# Patient Record
Sex: FEMALE | Race: WHITE | NOT HISPANIC OR LATINO | Employment: FULL TIME | URBAN - METROPOLITAN AREA
[De-identification: names, ages, dates, MRNs, and addresses within clinical notes are randomized per-mention and may not be internally consistent; named-entity substitution may affect disease eponyms.]

---

## 2018-03-20 ENCOUNTER — OFFICE VISIT CONVERTED (OUTPATIENT)
Dept: FAMILY MEDICINE CLINIC | Facility: CLINIC | Age: 23
End: 2018-03-20
Attending: NURSE PRACTITIONER

## 2018-04-16 ENCOUNTER — OFFICE VISIT CONVERTED (OUTPATIENT)
Dept: FAMILY MEDICINE CLINIC | Facility: CLINIC | Age: 23
End: 2018-04-16
Attending: NURSE PRACTITIONER

## 2018-04-19 ENCOUNTER — OFFICE VISIT CONVERTED (OUTPATIENT)
Dept: FAMILY MEDICINE CLINIC | Facility: CLINIC | Age: 23
End: 2018-04-19
Attending: FAMILY MEDICINE

## 2018-04-20 ENCOUNTER — CONVERSION ENCOUNTER (OUTPATIENT)
Dept: FAMILY MEDICINE CLINIC | Facility: CLINIC | Age: 23
End: 2018-04-20

## 2018-04-20 ENCOUNTER — OFFICE VISIT CONVERTED (OUTPATIENT)
Dept: FAMILY MEDICINE CLINIC | Facility: CLINIC | Age: 23
End: 2018-04-20
Attending: NURSE PRACTITIONER

## 2018-10-29 ENCOUNTER — OFFICE VISIT CONVERTED (OUTPATIENT)
Dept: FAMILY MEDICINE CLINIC | Facility: CLINIC | Age: 23
End: 2018-10-29
Attending: NURSE PRACTITIONER

## 2018-11-26 ENCOUNTER — OFFICE VISIT CONVERTED (OUTPATIENT)
Dept: FAMILY MEDICINE CLINIC | Facility: CLINIC | Age: 23
End: 2018-11-26
Attending: NURSE PRACTITIONER

## 2018-12-19 ENCOUNTER — OFFICE VISIT CONVERTED (OUTPATIENT)
Dept: FAMILY MEDICINE CLINIC | Facility: CLINIC | Age: 23
End: 2018-12-19
Attending: NURSE PRACTITIONER

## 2019-04-11 ENCOUNTER — HOSPITAL ENCOUNTER (OUTPATIENT)
Dept: FAMILY MEDICINE CLINIC | Facility: CLINIC | Age: 24
Discharge: HOME | End: 2019-04-11
Attending: NURSE PRACTITIONER

## 2019-04-11 ENCOUNTER — OFFICE VISIT CONVERTED (OUTPATIENT)
Dept: FAMILY MEDICINE CLINIC | Facility: CLINIC | Age: 24
End: 2019-04-11
Attending: NURSE PRACTITIONER

## 2019-04-11 LAB
ALBUMIN SERPL-MCNC: 4.3 G/DL (ref 3.5–5)
ALBUMIN/GLOB SERPL: 1.3 {RATIO} (ref 1.4–2.6)
ALP SERPL-CCNC: 49 U/L (ref 42–98)
ALT SERPL-CCNC: 15 U/L (ref 10–40)
ANION GAP SERPL CALC-SCNC: 20 MMOL/L (ref 8–19)
APPEARANCE UR: CLEAR
AST SERPL-CCNC: 18 U/L (ref 15–50)
BASOPHILS # BLD AUTO: 0.08 10*3/UL (ref 0–0.2)
BASOPHILS NFR BLD AUTO: 0.8 % (ref 0–3)
BILIRUB SERPL-MCNC: 0.28 MG/DL (ref 0.2–1.3)
BILIRUB UR QL: NEGATIVE
BUN SERPL-MCNC: 9 MG/DL (ref 5–25)
BUN/CREAT SERPL: 12 {RATIO} (ref 6–20)
CALCIUM SERPL-MCNC: 9.4 MG/DL (ref 8.7–10.4)
CHLORIDE SERPL-SCNC: 103 MMOL/L (ref 99–111)
CHOLEST SERPL-MCNC: 142 MG/DL (ref 107–200)
CHOLEST/HDLC SERPL: 3.3 {RATIO} (ref 3–6)
COLOR UR: YELLOW
CONV ABS IMM GRAN: 0.09 10*3/UL (ref 0–0.2)
CONV BACTERIA: NEGATIVE
CONV CO2: 21 MMOL/L (ref 22–32)
CONV COLLECTION SOURCE (UA): ABNORMAL
CONV CREATININE URINE, RANDOM: 34.5 MG/DL (ref 10–300)
CONV IMMATURE GRAN: 1 % (ref 0–1.8)
CONV MICROALBUM.,U,RANDOM: <12 MG/L (ref 0–20)
CONV TOTAL PROTEIN: 7.6 G/DL (ref 6.3–8.2)
CONV UROBILINOGEN IN URINE BY AUTOMATED TEST STRIP: 0.2 {EHRLICHU}/DL (ref 0.1–1)
CREAT UR-MCNC: 0.73 MG/DL (ref 0.5–0.9)
DEPRECATED RDW RBC AUTO: 44.1 FL (ref 36.4–46.3)
EOSINOPHIL # BLD AUTO: 0.24 10*3/UL (ref 0–0.7)
EOSINOPHIL # BLD AUTO: 2.5 % (ref 0–7)
ERYTHROCYTE [DISTWIDTH] IN BLOOD BY AUTOMATED COUNT: 13.1 % (ref 11.7–14.4)
GFR SERPLBLD BASED ON 1.73 SQ M-ARVRAT: >60 ML/MIN/{1.73_M2}
GLOBULIN UR ELPH-MCNC: 3.3 G/DL (ref 2–3.5)
GLUCOSE SERPL-MCNC: 83 MG/DL (ref 65–99)
GLUCOSE UR QL: NEGATIVE MG/DL
HBA1C MFR BLD: 12.5 G/DL (ref 12–16)
HCT VFR BLD AUTO: 39.9 % (ref 37–47)
HDLC SERPL-MCNC: 43 MG/DL (ref 40–60)
HGB UR QL STRIP: NEGATIVE
KETONES UR QL STRIP: NEGATIVE MG/DL
LDLC SERPL CALC-MCNC: 66 MG/DL (ref 70–100)
LEUKOCYTE ESTERASE UR QL STRIP: ABNORMAL
LYMPHOCYTES # BLD AUTO: 2.72 10*3/UL (ref 1–5)
MCH RBC QN AUTO: 28.8 PG (ref 27–31)
MCHC RBC AUTO-ENTMCNC: 31.3 G/DL (ref 33–37)
MCV RBC AUTO: 91.9 FL (ref 81–99)
MICROALBUMIN/CREAT UR: 34.8 MG/G{CRE} (ref 0–35)
MONOCYTES # BLD AUTO: 0.46 10*3/UL (ref 0.2–1.2)
MONOCYTES NFR BLD AUTO: 4.9 % (ref 3–10)
NEUTROPHILS # BLD AUTO: 5.84 10*3/UL (ref 2–8)
NEUTROPHILS NFR BLD AUTO: 62 % (ref 30–85)
NITRITE UR QL STRIP: NEGATIVE
NRBC CBCN: 0 % (ref 0–0.7)
OSMOLALITY SERPL CALC.SUM OF ELEC: 288 MOSM/KG (ref 273–304)
PH UR STRIP.AUTO: 6.5 [PH] (ref 5–8)
PLATELET # BLD AUTO: 388 10*3/UL (ref 130–400)
PMV BLD AUTO: 11.7 FL (ref 9.4–12.3)
POTASSIUM SERPL-SCNC: 4.3 MMOL/L (ref 3.5–5.3)
PROT UR QL: NEGATIVE MG/DL
RBC # BLD AUTO: 4.34 10*6/UL (ref 4.2–5.4)
RBC #/AREA URNS HPF: ABNORMAL /[HPF]
SODIUM SERPL-SCNC: 140 MMOL/L (ref 135–147)
SP GR UR: 1.01 (ref 1–1.03)
SQUAMOUS SPT QL MICRO: ABNORMAL /[HPF]
TRIGL SERPL-MCNC: 166 MG/DL (ref 40–150)
VARIANT LYMPHS NFR BLD MANUAL: 28.8 % (ref 20–45)
VLDLC SERPL-MCNC: 33 MG/DL (ref 5–37)
WBC # BLD AUTO: 9.43 10*3/UL (ref 4.8–10.8)
WBC #/AREA URNS HPF: ABNORMAL /[HPF]

## 2021-02-04 ENCOUNTER — TELEPHONE (OUTPATIENT)
Dept: FAMILY MEDICINE CLINIC | Facility: CLINIC | Age: 26
End: 2021-02-04

## 2021-02-04 NOTE — TELEPHONE ENCOUNTER
Called new patient and had to leave a VM we have her scheduled on 02/15/2021 with Dr.Kelly Elizabeth, he will be out of the office that day so I told her to contact our office and we will reschedule her appt.

## 2021-02-08 ENCOUNTER — OFFICE VISIT (OUTPATIENT)
Dept: FAMILY MEDICINE CLINIC | Facility: CLINIC | Age: 26
End: 2021-02-08

## 2021-02-08 VITALS
TEMPERATURE: 98.2 F | SYSTOLIC BLOOD PRESSURE: 180 MMHG | HEART RATE: 119 BPM | OXYGEN SATURATION: 98 % | HEIGHT: 68 IN | DIASTOLIC BLOOD PRESSURE: 110 MMHG | BODY MASS INDEX: 40.32 KG/M2 | WEIGHT: 266 LBS

## 2021-02-08 DIAGNOSIS — E28.2 PCOS (POLYCYSTIC OVARIAN SYNDROME): ICD-10-CM

## 2021-02-08 DIAGNOSIS — I10 ESSENTIAL HYPERTENSION: ICD-10-CM

## 2021-02-08 DIAGNOSIS — G44.229 CHRONIC TENSION-TYPE HEADACHE, NOT INTRACTABLE: ICD-10-CM

## 2021-02-08 DIAGNOSIS — E66.01 CLASS 3 SEVERE OBESITY DUE TO EXCESS CALORIES WITHOUT SERIOUS COMORBIDITY WITH BODY MASS INDEX (BMI) OF 40.0 TO 44.9 IN ADULT (HCC): ICD-10-CM

## 2021-02-08 DIAGNOSIS — Z00.00 PREVENTATIVE HEALTH CARE: Primary | ICD-10-CM

## 2021-02-08 PROBLEM — E66.813 CLASS 3 SEVERE OBESITY DUE TO EXCESS CALORIES WITHOUT SERIOUS COMORBIDITY WITH BODY MASS INDEX (BMI) OF 40.0 TO 44.9 IN ADULT: Status: ACTIVE | Noted: 2021-02-08

## 2021-02-08 PROCEDURE — 99204 OFFICE O/P NEW MOD 45 MIN: CPT | Performed by: NURSE PRACTITIONER

## 2021-02-08 RX ORDER — LISINOPRIL AND HYDROCHLOROTHIAZIDE 20; 12.5 MG/1; MG/1
1 TABLET ORAL DAILY
Qty: 30 TABLET | Refills: 2 | Status: SHIPPED | OUTPATIENT
Start: 2021-02-08 | End: 2021-05-11

## 2021-02-08 NOTE — PATIENT INSTRUCTIONS
Fasting blood work  Keep appointment with OB/GYN  Take blood pressure medicine as directed monitor blood pressures according to parameters given and call office  Diet and exercise consider bariatric referral  Follow-up 6 months

## 2021-02-08 NOTE — PROGRESS NOTES
Meeta Garcia is a 25 y.o. female.     25-year-old obese white female with history of PCOS, headaches and hypertension who comes in today to be established as new patient  Patient currently not taking any medications for about a year.  Her blood pressure today is 180/110 heart rate 120 and she denies any chest pain, dyspnea, tachycardia or dizziness.  Unfortunately patient is unable to tell when her pressure is high.  I am placing her on lisinopril hydrochlorothiazide in the morning and she is to monitor blood pressures with the parameters given and call office if they are not met.  I also told her to monitor her heart rate to see if it is always over 100  Patient does not have an OB/GYN here since she moved here a year ago [facility] to be setting her up with one  Patient has a history of diabetes on her dad's side will be doing fasting blood work to make sure there are no abnormal findings  Patient had Covid in January 2021 still is unable to taste or smell anything.  I encouraged her to still get the vaccine 90 days after having Covid   patient has a history of headaches  mainly around the temple area off and on.  She has not had eye exam for a couple years I advised her to get one but to let me know if headaches worsen.  She has had no work-up for headaches.  Or been to neurology  Weight is 266 with a BMI of 40.5.  Patient states no matter what she does she just cannot lose weight we discussed options such as bariatric surgery.  She is going to follow-up with her OB/GYN    Fasting blood work  OB/GYN referral  Lisinopril/hydrochlorothiazide 20/12.5 mg daily  Monitor blood pressure according to parameters given   Diet and exercise consider bariatric surgery referral  call the office follow-up 6 months           The following portions of the patient's history were reviewed and updated as appropriate: allergies, current medications, past family history, past medical history, past social history, past surgical history  "and problem list.    Vitals:    02/08/21 1456   BP: (!) 180/110   BP Location: Left arm   Patient Position: Sitting   Cuff Size: Large Adult   Pulse: 119   Temp: 98.2 °F (36.8 °C)   TempSrc: Temporal   SpO2: 98%   Weight: 121 kg (266 lb)   Height: 172.7 cm (68\")     Body mass index is 40.45 kg/m².    Past Medical History:   Diagnosis Date   • Hypertension    • PCOS (polycystic ovarian syndrome)      History reviewed. No pertinent surgical history.  Family History   Problem Relation Age of Onset   • Hypertension Mother    • Diabetes Father      Immunization History   Administered Date(s) Administered   • DTP / HiB 02/13/1996, 04/16/1996   • DTaP, Unspecified 09/09/1999   • Flu Mist 08/27/2013   • Flulaval/Fluarix/Fluzone Quad 09/01/2020   • HPV Quadrivalent 09/08/2010, 03/10/2011   • Influenza LAIV (Nasal) 01/24/2013   • MMR 09/09/1999   • OPV 02/13/1996, 04/16/1996, 09/09/1999       No results found for any previous visit.         Review of Systems   Constitutional: Negative.    HENT: Negative.    Respiratory: Negative.    Cardiovascular: Negative.    Gastrointestinal: Negative.    Genitourinary: Negative.    Musculoskeletal: Negative.    Skin: Negative.    Neurological: Positive for headache.   Psychiatric/Behavioral: Negative.        Objective   Physical Exam  Constitutional:       Appearance: Normal appearance.   HENT:      Head: Normocephalic.   Neck:      Musculoskeletal: Normal range of motion.   Cardiovascular:      Rate and Rhythm: Normal rate and regular rhythm.      Pulses: Normal pulses.      Heart sounds: Normal heart sounds.   Pulmonary:      Effort: Pulmonary effort is normal.      Breath sounds: Normal breath sounds.   Abdominal:      General: Bowel sounds are normal.   Musculoskeletal: Normal range of motion.   Skin:     General: Skin is warm and dry.   Neurological:      General: No focal deficit present.      Mental Status: She is alert and oriented to person, place, and time.   Psychiatric:        "  Mood and Affect: Mood normal.         Procedures    Assessment/Plan   Diagnoses and all orders for this visit:    1. Preventative health care (Primary)  -     CBC & Differential; Future  -     Comprehensive Metabolic Panel; Future  -     Lipid Panel With LDL / HDL Ratio; Future    2. Essential hypertension    3. PCOS (polycystic ovarian syndrome)    4. Class 3 severe obesity due to excess calories without serious comorbidity with body mass index (BMI) of 40.0 to 44.9 in adult (CMS/[facility])    5. Chronic tension-type headache, not intractable    Other orders  -     lisinopril-hydrochlorothiazide (PRINZIDE,ZESTORETIC) 20-12.5 MG per tablet; Take 1 tablet by mouth Daily.  Dispense: 30 tablet; Refill: 2          Current Outpatient Medications:   •  lisinopril-hydrochlorothiazide (PRINZIDE,ZESTORETIC) 20-12.5 MG per tablet, Take 1 tablet by mouth Daily., Disp: 30 tablet, Rfl: 2

## 2021-02-09 DIAGNOSIS — Z00.00 PREVENTATIVE HEALTH CARE: ICD-10-CM

## 2021-02-10 LAB
ALBUMIN SERPL-MCNC: 4.4 G/DL (ref 3.9–5)
ALBUMIN/GLOB SERPL: 1.3 {RATIO} (ref 1.2–2.2)
ALP SERPL-CCNC: 83 IU/L (ref 39–117)
ALT SERPL-CCNC: 17 IU/L (ref 0–32)
AST SERPL-CCNC: 16 IU/L (ref 0–40)
BASOPHILS # BLD AUTO: 0.1 X10E3/UL (ref 0–0.2)
BASOPHILS NFR BLD AUTO: 1 %
BILIRUB SERPL-MCNC: 0.5 MG/DL (ref 0–1.2)
BUN SERPL-MCNC: 8 MG/DL (ref 6–20)
BUN/CREAT SERPL: 11 (ref 9–23)
CALCIUM SERPL-MCNC: 9.8 MG/DL (ref 8.7–10.2)
CHLORIDE SERPL-SCNC: 100 MMOL/L (ref 96–106)
CHOLEST SERPL-MCNC: 184 MG/DL (ref 100–199)
CO2 SERPL-SCNC: 23 MMOL/L (ref 20–29)
CREAT SERPL-MCNC: 0.72 MG/DL (ref 0.57–1)
EOSINOPHIL # BLD AUTO: 0.2 X10E3/UL (ref 0–0.4)
EOSINOPHIL NFR BLD AUTO: 1 %
ERYTHROCYTE [DISTWIDTH] IN BLOOD BY AUTOMATED COUNT: 13.6 % (ref 11.7–15.4)
GLOBULIN SER CALC-MCNC: 3.3 G/DL (ref 1.5–4.5)
GLUCOSE SERPL-MCNC: 78 MG/DL (ref 65–99)
HCT VFR BLD AUTO: 39.8 % (ref 34–46.6)
HDLC SERPL-MCNC: 43 MG/DL
HGB BLD-MCNC: 12.9 G/DL (ref 11.1–15.9)
IMM GRANULOCYTES # BLD AUTO: 0.1 X10E3/UL (ref 0–0.1)
IMM GRANULOCYTES NFR BLD AUTO: 1 %
LDLC SERPL CALC-MCNC: 103 MG/DL (ref 0–99)
LDLC/HDLC SERPL: 2.4 RATIO (ref 0–3.2)
LYMPHOCYTES # BLD AUTO: 3.8 X10E3/UL (ref 0.7–3.1)
LYMPHOCYTES NFR BLD AUTO: 29 %
MCH RBC QN AUTO: 27.2 PG (ref 26.6–33)
MCHC RBC AUTO-ENTMCNC: 32.4 G/DL (ref 31.5–35.7)
MCV RBC AUTO: 84 FL (ref 79–97)
MONOCYTES # BLD AUTO: 0.6 X10E3/UL (ref 0.1–0.9)
MONOCYTES NFR BLD AUTO: 4 %
NEUTROPHILS # BLD AUTO: 8.3 X10E3/UL (ref 1.4–7)
NEUTROPHILS NFR BLD AUTO: 64 %
PLATELET # BLD AUTO: 431 X10E3/UL (ref 150–450)
POTASSIUM SERPL-SCNC: 4.3 MMOL/L (ref 3.5–5.2)
PROT SERPL-MCNC: 7.7 G/DL (ref 6–8.5)
RBC # BLD AUTO: 4.75 X10E6/UL (ref 3.77–5.28)
SODIUM SERPL-SCNC: 137 MMOL/L (ref 134–144)
TRIGL SERPL-MCNC: 219 MG/DL (ref 0–149)
VLDLC SERPL CALC-MCNC: 38 MG/DL (ref 5–40)
WBC # BLD AUTO: 13 X10E3/UL (ref 3.4–10.8)

## 2021-05-09 VITALS
WEIGHT: 247 LBS | DIASTOLIC BLOOD PRESSURE: 80 MMHG | TEMPERATURE: 97.4 F | OXYGEN SATURATION: 98 % | SYSTOLIC BLOOD PRESSURE: 120 MMHG | HEART RATE: 140 BPM

## 2021-05-09 VITALS
SYSTOLIC BLOOD PRESSURE: 142 MMHG | OXYGEN SATURATION: 98 % | OXYGEN SATURATION: 98 % | WEIGHT: 239.12 LBS | HEART RATE: 130 BPM | HEIGHT: 68 IN | DIASTOLIC BLOOD PRESSURE: 84 MMHG | WEIGHT: 238.12 LBS | HEART RATE: 104 BPM | TEMPERATURE: 97 F | SYSTOLIC BLOOD PRESSURE: 190 MMHG | TEMPERATURE: 97.4 F | BODY MASS INDEX: 36.24 KG/M2 | DIASTOLIC BLOOD PRESSURE: 102 MMHG

## 2021-05-09 VITALS
WEIGHT: 249.12 LBS | HEART RATE: 112 BPM | SYSTOLIC BLOOD PRESSURE: 138 MMHG | OXYGEN SATURATION: 99 % | DIASTOLIC BLOOD PRESSURE: 80 MMHG | TEMPERATURE: 98.6 F | BODY MASS INDEX: 37.76 KG/M2 | HEIGHT: 68 IN

## 2021-05-09 VITALS
SYSTOLIC BLOOD PRESSURE: 180 MMHG | OXYGEN SATURATION: 98 % | WEIGHT: 236 LBS | HEIGHT: 68 IN | HEART RATE: 117 BPM | BODY MASS INDEX: 35.77 KG/M2 | TEMPERATURE: 97 F | DIASTOLIC BLOOD PRESSURE: 110 MMHG

## 2021-05-09 VITALS
DIASTOLIC BLOOD PRESSURE: 80 MMHG | OXYGEN SATURATION: 99 % | WEIGHT: 253 LBS | HEART RATE: 125 BPM | SYSTOLIC BLOOD PRESSURE: 146 MMHG | TEMPERATURE: 98 F

## 2021-05-09 VITALS
OXYGEN SATURATION: 99 % | WEIGHT: 255 LBS | TEMPERATURE: 97.7 F | HEART RATE: 122 BPM | DIASTOLIC BLOOD PRESSURE: 86 MMHG | SYSTOLIC BLOOD PRESSURE: 136 MMHG

## 2021-05-09 VITALS
HEIGHT: 69 IN | OXYGEN SATURATION: 96 % | BODY MASS INDEX: 34.8 KG/M2 | SYSTOLIC BLOOD PRESSURE: 160 MMHG | TEMPERATURE: 98.6 F | HEART RATE: 114 BPM | DIASTOLIC BLOOD PRESSURE: 90 MMHG | WEIGHT: 235 LBS

## 2021-05-11 RX ORDER — LISINOPRIL AND HYDROCHLOROTHIAZIDE 20; 12.5 MG/1; MG/1
TABLET ORAL
Qty: 30 TABLET | Refills: 0 | Status: SHIPPED | OUTPATIENT
Start: 2021-05-11 | End: 2021-06-13

## 2021-05-18 ENCOUNTER — OFFICE VISIT (OUTPATIENT)
Dept: FAMILY MEDICINE CLINIC | Facility: CLINIC | Age: 26
End: 2021-05-18

## 2021-05-18 VITALS
BODY MASS INDEX: 39.04 KG/M2 | OXYGEN SATURATION: 96 % | SYSTOLIC BLOOD PRESSURE: 152 MMHG | DIASTOLIC BLOOD PRESSURE: 81 MMHG | HEIGHT: 68 IN | WEIGHT: 257.6 LBS | HEART RATE: 118 BPM | TEMPERATURE: 97.6 F

## 2021-05-18 DIAGNOSIS — E28.2 PCOS (POLYCYSTIC OVARIAN SYNDROME): ICD-10-CM

## 2021-05-18 DIAGNOSIS — N92.6 ABNORMAL MENSTRUAL PERIODS: ICD-10-CM

## 2021-05-18 DIAGNOSIS — E66.01 CLASS 3 SEVERE OBESITY DUE TO EXCESS CALORIES WITHOUT SERIOUS COMORBIDITY WITH BODY MASS INDEX (BMI) OF 40.0 TO 44.9 IN ADULT (HCC): Primary | ICD-10-CM

## 2021-05-18 PROCEDURE — 99213 OFFICE O/P EST LOW 20 MIN: CPT | Performed by: NURSE PRACTITIONER

## 2021-05-18 RX ORDER — ONDANSETRON 4 MG/1
4 TABLET, FILM COATED ORAL EVERY 8 HOURS PRN
Qty: 30 TABLET | Refills: 2 | Status: SHIPPED | OUTPATIENT
Start: 2021-05-18 | End: 2021-06-03

## 2021-05-18 RX ORDER — DROSPIRENONE AND ETHINYL ESTRADIOL 0.03MG-3MG
1 KIT ORAL DAILY
COMMUNITY
Start: 2021-04-12

## 2021-05-18 NOTE — PROGRESS NOTES
"    Meeta Garcia is a 25 y.o. female.     25-year-old obese white female with history of PCOS, headaches and hypertension who comes in today with complaints of nausea.  Patient was recently placed on birth control 4 weeks ago and states ever since then she has been having extremely heavy periods every day for the last 4 weeks and after talking with patient her pain is really in her abdomen not her stomach.  Patient states she has never had menstrual cramps before which I am assuming is what she is having.  She does have some nausea but has not had any emesis I am placing her on Zofran and Prilosec and instructed her to call her OB/GYN today to see if this is something she needs to see her about.  If OB/GYN does not want to see her in this heavy period persist I instructed patient to let me draw a CBC on her make sure she is not getting anemic.  Patient had her first Covid injection 3 weeks ago and has another one scheduled next week blood pressure 152/80 heart rate 116 she denies any chest pain, dyspnea, tachycardia or dizziness  Weight is down 8 pounds at 258 with a BMI 39.2 patient states she has been eating less because has been so busy at work      Call OB/GYN today about heavy periods  If heavy periods persist call office for CBC  Zofran 4 mg every 6 hours x3 days then as needed  Tolley diet  Prilosec 20 mg daily for 2 weeks  If nausea persists call office         The following portions of the patient's history were reviewed and updated as appropriate: allergies, current medications, past family history, past medical history, past social history, past surgical history and problem list.    Vitals:    05/18/21 0803   BP: 152/81   BP Location: Left arm   Patient Position: Sitting   Cuff Size: Large Adult   Pulse: 118   Temp: 97.6 °F (36.4 °C)   TempSrc: Temporal   SpO2: 96%   Weight: 117 kg (257 lb 9.6 oz)   Height: 172.7 cm (68\")     Body mass index is 39.17 kg/m².    Past Medical History:   Diagnosis Date   • " Hypertension    • PCOS (polycystic ovarian syndrome)      No past surgical history on file.  Family History   Problem Relation Age of Onset   • Hypertension Mother    • Diabetes Father      Immunization History   Administered Date(s) Administered   • DTP / HiB 02/13/1996, 04/16/1996   • DTaP, Unspecified 09/09/1999   • Flu Mist 08/27/2013   • Flulaval/Fluarix/Fluzone Quad 09/01/2020   • HPV Quadrivalent 09/08/2010, 03/10/2011   • Influenza LAIV (Nasal) 01/24/2013   • MMR 09/09/1999   • OPV 02/13/1996, 04/16/1996, 09/09/1999       Orders Only on 02/09/2021   Component Date Value Ref Range Status   • Total Cholesterol 02/09/2021 184  100 - 199 mg/dL Final   • Triglycerides 02/09/2021 219* 0 - 149 mg/dL Final   • HDL Cholesterol 02/09/2021 43  >39 mg/dL Final   • VLDL Cholesterol Gabe 02/09/2021 38  5 - 40 mg/dL Final   • LDL Chol Calc ([facility]) 02/09/2021 103* 0 - 99 mg/dL Final   • LDL/HDL RATIO 02/09/2021 2.4  0.0 - 3.2 ratio Final    Comment:                                     LDL/HDL Ratio                                              Men  Women                                1/2 Avg.Risk  1.0    1.5                                    Avg.Risk  3.6    3.2                                 2X Avg.Risk  6.2    5.0                                 3X Avg.Risk  8.0    6.1     • Glucose 02/09/2021 78  65 - 99 mg/dL Final   • BUN 02/09/2021 8  6 - 20 mg/dL Final   • Creatinine 02/09/2021 0.72  0.57 - 1.00 mg/dL Final   • eGFR Non  Am 02/09/2021 117  >59 mL/min/1.73 Final   • eGFR African Am 02/09/2021 135  >59 mL/min/1.73 Final   • BUN/Creatinine Ratio 02/09/2021 11  9 - 23 Final   • Sodium 02/09/2021 137  134 - 144 mmol/L Final   • Potassium 02/09/2021 4.3  3.5 - 5.2 mmol/L Final   • Chloride 02/09/2021 100  96 - 106 mmol/L Final   • Total CO2 02/09/2021 23  20 - 29 mmol/L Final   • Calcium 02/09/2021 9.8  8.7 - 10.2 mg/dL Final   • Total Protein 02/09/2021 7.7  6.0 - 8.5 g/dL Final   • Albumin 02/09/2021 4.4  3.9 - 5.0  g/dL Final   • Globulin 02/09/2021 3.3  1.5 - 4.5 g/dL Final   • A/G Ratio 02/09/2021 1.3  1.2 - 2.2 Final   • Total Bilirubin 02/09/2021 0.5  0.0 - 1.2 mg/dL Final   • Alkaline Phosphatase 02/09/2021 83  39 - 117 IU/L Final   • AST (SGOT) 02/09/2021 16  0 - 40 IU/L Final   • ALT (SGPT) 02/09/2021 17  0 - 32 IU/L Final   • WBC 02/09/2021 13.0* 3.4 - 10.8 x10E3/uL Final   • RBC 02/09/2021 4.75  3.77 - 5.28 x10E6/uL Final   • Hemoglobin 02/09/2021 12.9  11.1 - 15.9 g/dL Final   • Hematocrit 02/09/2021 39.8  34.0 - 46.6 % Final   • MCV 02/09/2021 84  79 - 97 fL Final   • MCH 02/09/2021 27.2  26.6 - 33.0 pg Final   • MCHC 02/09/2021 32.4  31.5 - 35.7 g/dL Final   • RDW 02/09/2021 13.6  11.7 - 15.4 % Final   • Platelets 02/09/2021 431  150 - 450 x10E3/uL Final   • Neutrophil Rel % 02/09/2021 64  Not Estab. % Final   • Lymphocyte Rel % 02/09/2021 29  Not Estab. % Final   • Monocyte Rel % 02/09/2021 4  Not Estab. % Final   • Eosinophil Rel % 02/09/2021 1  Not Estab. % Final   • Basophil Rel % 02/09/2021 1  Not Estab. % Final   • Neutrophils Absolute 02/09/2021 8.3* 1.4 - 7.0 x10E3/uL Final   • Lymphocytes Absolute 02/09/2021 3.8* 0.7 - 3.1 x10E3/uL Final   • Monocytes Absolute 02/09/2021 0.6  0.1 - 0.9 x10E3/uL Final   • Eosinophils Absolute 02/09/2021 0.2  0.0 - 0.4 x10E3/uL Final   • Basophils Absolute 02/09/2021 0.1  0.0 - 0.2 x10E3/uL Final   • Immature Granulocyte Rel % 02/09/2021 1  Not Estab. % Final   • Immature Grans Absolute 02/09/2021 0.1  0.0 - 0.1 x10E3/uL Final         Review of Systems   Constitutional: Negative.    HENT: Negative.    Respiratory: Negative.    Cardiovascular: Negative.    Gastrointestinal: Positive for abdominal pain and nausea.   Genitourinary: Positive for menstrual problem.   Musculoskeletal: Negative.    Neurological: Negative.    Psychiatric/Behavioral: Negative.        Objective   Physical Exam  Constitutional:       Appearance: Normal appearance.   HENT:      Head: Normocephalic.    Cardiovascular:      Rate and Rhythm: Normal rate and regular rhythm.   Pulmonary:      Effort: Pulmonary effort is normal.   Abdominal:      General: Bowel sounds are normal.   Musculoskeletal:         General: Normal range of motion.      Cervical back: Normal range of motion.   Skin:     General: Skin is warm and dry.   Neurological:      General: No focal deficit present.      Mental Status: She is alert and oriented to person, place, and time.   Psychiatric:         Mood and Affect: Mood normal.         Behavior: Behavior normal.         Procedures    Assessment/Plan   There are no diagnoses linked to this encounter.      Current Outpatient Medications:   •  drospirenone-ethinyl estradiol (CA,OCELLA) 3-0.03 MG per tablet, Take 1 tablet by mouth Daily., Disp: , Rfl:   •  lisinopril-hydrochlorothiazide (PRINZIDE,ZESTORETIC) 20-12.5 MG per tablet, Take 1 tablet by mouth once daily, Disp: 30 tablet, Rfl: 0  •  ondansetron (Zofran) 4 MG tablet, Take 1 tablet by mouth Every 8 (Eight) Hours As Needed for Nausea or Vomiting., Disp: 30 tablet, Rfl: 2

## 2021-06-03 ENCOUNTER — HOSPITAL ENCOUNTER (EMERGENCY)
Facility: HOSPITAL | Age: 26
Discharge: HOME | End: 2021-06-03
Attending: EMERGENCY MEDICINE | Admitting: EMERGENCY MEDICINE

## 2021-06-03 VITALS
RESPIRATION RATE: 16 BRPM | BODY MASS INDEX: 37.04 KG/M2 | TEMPERATURE: 98.8 F | WEIGHT: 244.4 LBS | SYSTOLIC BLOOD PRESSURE: 121 MMHG | HEART RATE: 100 BPM | HEIGHT: 68 IN | DIASTOLIC BLOOD PRESSURE: 70 MMHG | OXYGEN SATURATION: 100 %

## 2021-06-03 DIAGNOSIS — R11.10 VOMITING, INTRACTABILITY OF VOMITING NOT SPECIFIED, PRESENCE OF NAUSEA NOT SPECIFIED, UNSPECIFIED VOMITING TYPE: Primary | ICD-10-CM

## 2021-06-03 DIAGNOSIS — N39.0 URINARY TRACT INFECTION WITHOUT HEMATURIA, SITE UNSPECIFIED: ICD-10-CM

## 2021-06-03 LAB
ALBUMIN SERPL-MCNC: 3.9 G/DL (ref 3.5–5.2)
ALBUMIN/GLOB SERPL: 1.1 G/DL
ALP SERPL-CCNC: 61 U/L (ref 39–117)
ALT SERPL W P-5'-P-CCNC: 9 U/L (ref 1–33)
ANION GAP SERPL CALCULATED.3IONS-SCNC: 14 MMOL/L (ref 5–15)
AST SERPL-CCNC: 11 U/L (ref 1–32)
B-HCG UR QL: NEGATIVE
BACTERIA UR QL AUTO: ABNORMAL /HPF
BASOPHILS # BLD AUTO: 0.1 10*3/MM3 (ref 0–0.2)
BASOPHILS NFR BLD AUTO: 0.4 % (ref 0–1.5)
BILIRUB SERPL-MCNC: 0.5 MG/DL (ref 0–1.2)
BILIRUB UR QL STRIP: NEGATIVE
BUN SERPL-MCNC: 5 MG/DL (ref 6–20)
BUN/CREAT SERPL: 6.8 (ref 7–25)
CALCIUM SPEC-SCNC: 9.3 MG/DL (ref 8.6–10.5)
CHLORIDE SERPL-SCNC: 97 MMOL/L (ref 98–107)
CLARITY UR: CLEAR
CO2 SERPL-SCNC: 22 MMOL/L (ref 22–29)
COLOR UR: YELLOW
CREAT SERPL-MCNC: 0.73 MG/DL (ref 0.57–1)
DEPRECATED RDW RBC AUTO: 42.4 FL (ref 37–54)
EOSINOPHIL # BLD AUTO: 0.1 10*3/MM3 (ref 0–0.4)
EOSINOPHIL NFR BLD AUTO: 0.7 % (ref 0.3–6.2)
ERYTHROCYTE [DISTWIDTH] IN BLOOD BY AUTOMATED COUNT: 14.5 % (ref 12.3–15.4)
GFR SERPL CREATININE-BSD FRML MDRD: 97 ML/MIN/1.73
GLOBULIN UR ELPH-MCNC: 3.4 GM/DL
GLUCOSE SERPL-MCNC: 108 MG/DL (ref 65–99)
GLUCOSE UR STRIP-MCNC: NEGATIVE MG/DL
HCT VFR BLD AUTO: 39.5 % (ref 34–46.6)
HGB BLD-MCNC: 13.2 G/DL (ref 12–15.9)
HGB UR QL STRIP.AUTO: NEGATIVE
HYALINE CASTS UR QL AUTO: ABNORMAL /LPF
KETONES UR QL STRIP: NEGATIVE
LEUKOCYTE ESTERASE UR QL STRIP.AUTO: ABNORMAL
LIPASE SERPL-CCNC: 16 U/L (ref 13–60)
LYMPHOCYTES # BLD AUTO: 2.2 10*3/MM3 (ref 0.7–3.1)
LYMPHOCYTES NFR BLD AUTO: 14.1 % (ref 19.6–45.3)
MCH RBC QN AUTO: 28.1 PG (ref 26.6–33)
MCHC RBC AUTO-ENTMCNC: 33.4 G/DL (ref 31.5–35.7)
MCV RBC AUTO: 84.2 FL (ref 79–97)
MONOCYTES # BLD AUTO: 0.7 10*3/MM3 (ref 0.1–0.9)
MONOCYTES NFR BLD AUTO: 4.4 % (ref 5–12)
NEUTROPHILS NFR BLD AUTO: 12.3 10*3/MM3 (ref 1.7–7)
NEUTROPHILS NFR BLD AUTO: 80.4 % (ref 42.7–76)
NITRITE UR QL STRIP: NEGATIVE
NRBC BLD AUTO-RTO: 0 /100 WBC (ref 0–0.2)
PH UR STRIP.AUTO: 6.5 [PH] (ref 5–8)
PLATELET # BLD AUTO: 441 10*3/MM3 (ref 140–450)
PMV BLD AUTO: 9 FL (ref 6–12)
POTASSIUM SERPL-SCNC: 4.4 MMOL/L (ref 3.5–5.2)
PROT SERPL-MCNC: 7.3 G/DL (ref 6–8.5)
PROT UR QL STRIP: NEGATIVE
RBC # BLD AUTO: 4.69 10*6/MM3 (ref 3.77–5.28)
RBC # UR: ABNORMAL /HPF
REF LAB TEST METHOD: ABNORMAL
SODIUM SERPL-SCNC: 133 MMOL/L (ref 136–145)
SP GR UR STRIP: 1.01 (ref 1–1.03)
SQUAMOUS #/AREA URNS HPF: ABNORMAL /HPF
UROBILINOGEN UR QL STRIP: ABNORMAL
WBC # BLD AUTO: 15.3 10*3/MM3 (ref 3.4–10.8)
WBC UR QL AUTO: ABNORMAL /HPF

## 2021-06-03 PROCEDURE — 85025 COMPLETE CBC W/AUTO DIFF WBC: CPT | Performed by: EMERGENCY MEDICINE

## 2021-06-03 PROCEDURE — 96374 THER/PROPH/DIAG INJ IV PUSH: CPT

## 2021-06-03 PROCEDURE — 25010000002 ONDANSETRON PER 1 MG: Performed by: EMERGENCY MEDICINE

## 2021-06-03 PROCEDURE — 81001 URINALYSIS AUTO W/SCOPE: CPT | Performed by: EMERGENCY MEDICINE

## 2021-06-03 PROCEDURE — 83690 ASSAY OF LIPASE: CPT | Performed by: EMERGENCY MEDICINE

## 2021-06-03 PROCEDURE — 81025 URINE PREGNANCY TEST: CPT | Performed by: EMERGENCY MEDICINE

## 2021-06-03 PROCEDURE — 99283 EMERGENCY DEPT VISIT LOW MDM: CPT

## 2021-06-03 PROCEDURE — 80053 COMPREHEN METABOLIC PANEL: CPT | Performed by: EMERGENCY MEDICINE

## 2021-06-03 RX ORDER — CEFDINIR 300 MG/1
300 CAPSULE ORAL 2 TIMES DAILY
Qty: 14 CAPSULE | Refills: 0 | Status: SHIPPED | OUTPATIENT
Start: 2021-06-03 | End: 2021-06-10

## 2021-06-03 RX ORDER — SODIUM CHLORIDE 0.9 % (FLUSH) 0.9 %
10 SYRINGE (ML) INJECTION AS NEEDED
Status: DISCONTINUED | OUTPATIENT
Start: 2021-06-03 | End: 2021-06-03 | Stop reason: HOSPADM

## 2021-06-03 RX ORDER — ONDANSETRON 2 MG/ML
4 INJECTION INTRAMUSCULAR; INTRAVENOUS ONCE
Status: COMPLETED | OUTPATIENT
Start: 2021-06-03 | End: 2021-06-03

## 2021-06-03 RX ORDER — ONDANSETRON 4 MG/1
4 TABLET, ORALLY DISINTEGRATING ORAL EVERY 8 HOURS PRN
Qty: 10 TABLET | Refills: 0 | Status: SHIPPED | OUTPATIENT
Start: 2021-06-03 | End: 2021-07-03

## 2021-06-03 RX ADMIN — SODIUM CHLORIDE 1000 ML: 9 INJECTION, SOLUTION INTRAVENOUS at 09:24

## 2021-06-03 RX ADMIN — ONDANSETRON 4 MG: 2 INJECTION INTRAMUSCULAR; INTRAVENOUS at 09:24

## 2021-06-03 NOTE — ED PROVIDER NOTES
"Subjective   Chief complaint: Vomiting    25-year-old female presents with nausea and vomiting.  Patient states symptoms have been ongoing intermittently ever since she got her Covid vaccine in April.  She has had some intermittent diarrhea as well.  She denies any fever.  She reports some mild abdominal cramping.  No known sick contacts.  She denies any alleviating or exacerbating factors.      History provided by:  Patient      Review of Systems   Constitutional: Negative for fever.   HENT: Negative for congestion and sore throat.    Eyes: Negative for redness.   Respiratory: Negative for cough and shortness of breath.    Cardiovascular: Negative for chest pain.   Gastrointestinal: Positive for abdominal pain, diarrhea, nausea and vomiting.   Genitourinary: Negative for dysuria.   Musculoskeletal: Negative for back pain.   Skin: Negative for rash.   Neurological: Negative for dizziness and headaches.   Psychiatric/Behavioral: Negative for confusion.       Past Medical History:   Diagnosis Date   • Hypertension    • PCOS (polycystic ovarian syndrome)        No Known Allergies    No past surgical history on file.    Family History   Problem Relation Age of Onset   • Hypertension Mother    • Diabetes Father        Social History     Socioeconomic History   • Marital status: Single     Spouse name: Not on file   • Number of children: Not on file   • Years of education: Not on file   • Highest education level: Not on file   Tobacco Use   • Smoking status: Never Smoker   • Smokeless tobacco: Never Used   Vaping Use   • Vaping Use: Never used   Substance and Sexual Activity   • Alcohol use: Never   • Drug use: Never   • Sexual activity: Yes     Partners: Male       /96 (BP Location: Left arm, Patient Position: Sitting)   Pulse 90   Temp 97.5 °F (36.4 °C) (Oral)   Resp 18   Ht 172.7 cm (68\")   Wt 111 kg (244 lb 6.4 oz)   LMP 05/24/2021   SpO2 99%   BMI 37.16 kg/m²       Objective   Physical Exam  Vitals and " nursing note reviewed.   Constitutional:       Appearance: Normal appearance. She is well-developed.   HENT:      Head: Normocephalic and atraumatic.   Eyes:      Extraocular Movements: Extraocular movements intact.      Pupils: Pupils are equal, round, and reactive to light.   Cardiovascular:      Rate and Rhythm: Normal rate and regular rhythm.      Heart sounds: Normal heart sounds.   Pulmonary:      Effort: Pulmonary effort is normal. No respiratory distress.      Breath sounds: Normal breath sounds.   Abdominal:      General: Bowel sounds are normal.      Palpations: Abdomen is soft.      Tenderness: There is no abdominal tenderness.   Musculoskeletal:         General: Normal range of motion.      Cervical back: Normal range of motion and neck supple.   Skin:     General: Skin is warm and dry.   Neurological:      General: No focal deficit present.      Mental Status: She is alert and oriented to person, place, and time.         Procedures           ED Course      Results for orders placed or performed during the hospital encounter of 06/03/21   Comprehensive Metabolic Panel    Specimen: Blood   Result Value Ref Range    Glucose 108 (H) 65 - 99 mg/dL    BUN 5 (L) 6 - 20 mg/dL    Creatinine 0.73 0.57 - 1.00 mg/dL    Sodium 133 (L) 136 - 145 mmol/L    Potassium 4.4 3.5 - 5.2 mmol/L    Chloride 97 (L) 98 - 107 mmol/L    CO2 22.0 22.0 - 29.0 mmol/L    Calcium 9.3 8.6 - 10.5 mg/dL    Total Protein 7.3 6.0 - 8.5 g/dL    Albumin 3.90 3.50 - 5.20 g/dL    ALT (SGPT) 9 1 - 33 U/L    AST (SGOT) 11 1 - 32 U/L    Alkaline Phosphatase 61 39 - 117 U/L    Total Bilirubin 0.5 0.0 - 1.2 mg/dL    eGFR Non African Amer 97 >60 mL/min/1.73    Globulin 3.4 gm/dL    A/G Ratio 1.1 g/dL    BUN/Creatinine Ratio 6.8 (L) 7.0 - 25.0    Anion Gap 14.0 5.0 - 15.0 mmol/L   Lipase    Specimen: Blood   Result Value Ref Range    Lipase 16 13 - 60 U/L   Urinalysis With Microscopic If Indicated (No Culture) - Urine, Clean Catch    Specimen: Urine,  Clean Catch   Result Value Ref Range    Color, UA Yellow Yellow, Straw    Appearance, UA Clear Clear    pH, UA 6.5 5.0 - 8.0    Specific Gravity, UA 1.007 1.005 - 1.030    Glucose, UA Negative Negative    Ketones, UA Negative Negative    Bilirubin, UA Negative Negative    Blood, UA Negative Negative    Protein, UA Negative Negative    Leuk Esterase, UA Trace (A) Negative    Nitrite, UA Negative Negative    Urobilinogen, UA 0.2 E.U./dL 0.2 - 1.0 E.U./dL   Pregnancy, Urine - Urine, Clean Catch    Specimen: Urine, Clean Catch   Result Value Ref Range    HCG, Urine QL Negative Negative   CBC Auto Differential    Specimen: Blood   Result Value Ref Range    WBC 15.30 (H) 3.40 - 10.80 10*3/mm3    RBC 4.69 3.77 - 5.28 10*6/mm3    Hemoglobin 13.2 12.0 - 15.9 g/dL    Hematocrit 39.5 34.0 - 46.6 %    MCV 84.2 79.0 - 97.0 fL    MCH 28.1 26.6 - 33.0 pg    MCHC 33.4 31.5 - 35.7 g/dL    RDW 14.5 12.3 - 15.4 %    RDW-SD 42.4 37.0 - 54.0 fl    MPV 9.0 6.0 - 12.0 fL    Platelets 441 140 - 450 10*3/mm3    Neutrophil % 80.4 (H) 42.7 - 76.0 %    Lymphocyte % 14.1 (L) 19.6 - 45.3 %    Monocyte % 4.4 (L) 5.0 - 12.0 %    Eosinophil % 0.7 0.3 - 6.2 %    Basophil % 0.4 0.0 - 1.5 %    Neutrophils, Absolute 12.30 (H) 1.70 - 7.00 10*3/mm3    Lymphocytes, Absolute 2.20 0.70 - 3.10 10*3/mm3    Monocytes, Absolute 0.70 0.10 - 0.90 10*3/mm3    Eosinophils, Absolute 0.10 0.00 - 0.40 10*3/mm3    Basophils, Absolute 0.10 0.00 - 0.20 10*3/mm3    nRBC 0.0 0.0 - 0.2 /100 WBC   Urinalysis, Microscopic Only - Urine, Clean Catch    Specimen: Urine, Clean Catch   Result Value Ref Range    RBC, UA 0-2 (A) None Seen /HPF    WBC, UA 3-5 (A) None Seen /HPF    Bacteria, UA 1+ (A) None Seen /HPF    Squamous Epithelial Cells, UA 7-12 (A) None Seen, 0-2 /HPF    Hyaline Casts, UA None Seen None Seen /LPF    Methodology Manual Light Microscopy                                           MDM   Patient had the above evaluation.  Results were discussed with the patient.   IV access was established and the patient was given IV fluids and nausea medicine.  She is feeling better.  Her blood cell count is mildly elevated at 15.3.  CMP is unremarkable.  Lipase is normal.  Patient does have evidence of mild urinary tract infection with trace leukocyte esterase, 1+ bacteria, 3-5 white blood cells.  Patient will be given prescriptions for Zofran and cefdinir.  She is to follow-up with her primary doctor.      Final diagnoses:   Vomiting, intractability of vomiting not specified, presence of nausea not specified, unspecified vomiting type   Urinary tract infection without hematuria, site unspecified       ED Disposition  ED Disposition     ED Disposition Condition Comment    Discharge Stable           Navi Colleen, APRN  [address]  [address]  [place] [address] [phone]    Call in 2 days           Medication List      New Prescriptions    cefdinir 300 MG capsule  Commonly known as: OMNICEF  Take 1 capsule by mouth 2 (Two) Times a Day for 7 days.     ondansetron ODT 4 MG disintegrating tablet  Commonly known as: ZOFRAN-ODT  Place 1 tablet on the tongue Every 8 (Eight) Hours As Needed for Nausea or Vomiting.           Where to Get Your Medications      These medications were sent to [facility] [address] [place] [address] [place] - [address] [facility] - [phone]  - [phone] FX  [address] [facility] [place] [address]    Phone: [phone]   · cefdinir 300 MG capsule  · ondansetron ODT 4 MG disintegrating tablet          Dimas Ansari MD  06/03/21 7614

## 2021-06-07 ENCOUNTER — OFFICE VISIT (OUTPATIENT)
Dept: FAMILY MEDICINE CLINIC | Facility: CLINIC | Age: 26
End: 2021-06-07

## 2021-06-07 VITALS
HEART RATE: 110 BPM | SYSTOLIC BLOOD PRESSURE: 146 MMHG | TEMPERATURE: 97.6 F | WEIGHT: 255.2 LBS | DIASTOLIC BLOOD PRESSURE: 88 MMHG | BODY MASS INDEX: 38.68 KG/M2 | OXYGEN SATURATION: 100 % | HEIGHT: 68 IN

## 2021-06-07 DIAGNOSIS — R11.0 NAUSEA IN ADULT: Primary | ICD-10-CM

## 2021-06-07 PROBLEM — E66.9 OBESITY: Status: ACTIVE | Noted: 2021-02-08

## 2021-06-07 PROCEDURE — 99213 OFFICE O/P EST LOW 20 MIN: CPT | Performed by: NURSE PRACTITIONER

## 2021-06-07 RX ORDER — SUCRALFATE 1 G/1
1 TABLET ORAL 4 TIMES DAILY
Qty: 60 TABLET | Refills: 0 | Status: SHIPPED | OUTPATIENT
Start: 2021-06-07 | End: 2021-08-31

## 2021-06-07 NOTE — PROGRESS NOTES
"    Meeta Garcia is a 25 y.o. female.     25-year-old obese white female with history of PCOS, headaches and hypertension who was here on May 18 with complaints of nausea.  Patient states she still having nausea and also had some diarrhea with it on [place] [address] where she went to the urgent care.  I placed her on Zofran and Prilosec which she states is not helping much I am going to add Carafate and to schedule her for an EGD.  Patient was started on Guillermina by her OB/GYN about 1 nausea started and although her periods have straightened out now I have concerned it may be causing nausea and she is to follow-up with OB/GYN and get her opinion on this.  She had a negative pregnancy test  when she went to the urgent care also in the timeframe patient got which also could be a cause of the nausea Covid vaccines previously  Blood pressure 146/86 heart rate 110 she denies any chest pain, dyspnea, tachycardia or dizziness  Weight is 255 with a BMI of 38.8    Call OB/GYN about birth control pills  Keep appointment with gastroenterology  Carafate 1 g 1-4 times a day as needed for nausea       The following portions of the patient's history were reviewed and updated as appropriate: allergies, current medications, past family history, past medical history, past social history, past surgical history and problem list.    Vitals:    06/07/21 1452   BP: 146/88   BP Location: Right arm   Patient Position: Sitting   Cuff Size: Large Adult   Pulse: 110   Temp: 97.6 °F (36.4 °C)   TempSrc: Temporal   SpO2: 100%   Weight: 116 kg (255 lb 3.2 oz)   Height: 172.7 cm (68\")     Body mass index is 38.8 kg/m².    Past Medical History:   Diagnosis Date   • Hypertension    • PCOS (polycystic ovarian syndrome)      No past surgical history on file.  Family History   Problem Relation Age of Onset   • Hypertension Mother    • Diabetes Father      Immunization History   Administered Date(s) Administered   • DTP / HiB 02/13/1996, 04/16/1996   • DTaP, " Unspecified 09/09/1999   • Flu Mist 08/27/2013   • Flulaval/Fluarix/Fluzone Quad 09/01/2020   • HPV Quadrivalent 09/08/2010, 03/10/2011   • Hepatitis A 10/29/2018   • Influenza LAIV (Nasal) 01/24/2013   • Influenza, Unspecified 10/29/2018   • MMR 09/09/1999   • OPV 02/13/1996, 04/16/1996, 09/09/1999   • Tdap 04/16/2018       Admission on 06/03/2021, Discharged on 06/03/2021   Component Date Value Ref Range Status   • Glucose 06/03/2021 108* 65 - 99 mg/dL Final   • BUN 06/03/2021 5* 6 - 20 mg/dL Final   • Creatinine 06/03/2021 0.73  0.57 - 1.00 mg/dL Final   • Sodium 06/03/2021 133* 136 - 145 mmol/L Final   • Potassium 06/03/2021 4.4  3.5 - 5.2 mmol/L Final   • Chloride 06/03/2021 97* 98 - 107 mmol/L Final   • CO2 06/03/2021 22.0  22.0 - 29.0 mmol/L Final   • Calcium 06/03/2021 9.3  8.6 - 10.5 mg/dL Final   • Total Protein 06/03/2021 7.3  6.0 - 8.5 g/dL Final   • Albumin 06/03/2021 3.90  3.50 - 5.20 g/dL Final   • ALT (SGPT) 06/03/2021 9  1 - 33 U/L Final   • AST (SGOT) 06/03/2021 11  1 - 32 U/L Final   • Alkaline Phosphatase 06/03/2021 61  39 - 117 U/L Final   • Total Bilirubin 06/03/2021 0.5  0.0 - 1.2 mg/dL Final   • eGFR Non African Amer 06/03/2021 97  >60 mL/min/1.73 Final   • Globulin 06/03/2021 3.4  gm/dL Final   • A/G Ratio 06/03/2021 1.1  g/dL Final   • BUN/Creatinine Ratio 06/03/2021 6.8* 7.0 - 25.0 Final   • Anion Gap 06/03/2021 14.0  5.0 - 15.0 mmol/L Final   • Lipase 06/03/2021 16  13 - 60 U/L Final   • Color, UA 06/03/2021 Yellow  Yellow, Straw Final   • Appearance, UA 06/03/2021 Clear  Clear Final    CHECKED   • pH, UA 06/03/2021 6.5  5.0 - 8.0 Final   • Specific Gravity, UA 06/03/2021 1.007  1.005 - 1.030 Final   • Glucose, UA 06/03/2021 Negative  Negative Final   • Ketones, UA 06/03/2021 Negative  Negative Final   • Bilirubin, UA 06/03/2021 Negative  Negative Final   • Blood, UA 06/03/2021 Negative  Negative Final   • Protein, UA 06/03/2021 Negative  Negative Final   • Leuk Esterase, UA 06/03/2021  Trace* Negative Final   • Nitrite, UA 06/03/2021 Negative  Negative Final   • Urobilinogen, UA 06/03/2021 0.2 E.U./dL  0.2 - 1.0 E.U./dL Final   • HCG, Urine QL 06/03/2021 Negative  Negative Final   • WBC 06/03/2021 15.30* 3.40 - 10.80 10*3/mm3 Final   • RBC 06/03/2021 4.69  3.77 - 5.28 10*6/mm3 Final   • Hemoglobin 06/03/2021 13.2  12.0 - 15.9 g/dL Final   • Hematocrit 06/03/2021 39.5  34.0 - 46.6 % Final   • MCV 06/03/2021 84.2  79.0 - 97.0 fL Final   • MCH 06/03/2021 28.1  26.6 - 33.0 pg Final   • MCHC 06/03/2021 33.4  31.5 - 35.7 g/dL Final   • RDW 06/03/2021 14.5  12.3 - 15.4 % Final   • RDW-SD 06/03/2021 42.4  37.0 - 54.0 fl Final   • MPV 06/03/2021 9.0  6.0 - 12.0 fL Final   • Platelets 06/03/2021 441  140 - 450 10*3/mm3 Final   • Neutrophil % 06/03/2021 80.4* 42.7 - 76.0 % Final   • Lymphocyte % 06/03/2021 14.1* 19.6 - 45.3 % Final   • Monocyte % 06/03/2021 4.4* 5.0 - 12.0 % Final   • Eosinophil % 06/03/2021 0.7  0.3 - 6.2 % Final   • Basophil % 06/03/2021 0.4  0.0 - 1.5 % Final   • Neutrophils, Absolute 06/03/2021 12.30* 1.70 - 7.00 10*3/mm3 Final   • Lymphocytes, Absolute 06/03/2021 2.20  0.70 - 3.10 10*3/mm3 Final   • Monocytes, Absolute 06/03/2021 0.70  0.10 - 0.90 10*3/mm3 Final   • Eosinophils, Absolute 06/03/2021 0.10  0.00 - 0.40 10*3/mm3 Final   • Basophils, Absolute 06/03/2021 0.10  0.00 - 0.20 10*3/mm3 Final   • nRBC 06/03/2021 0.0  0.0 - 0.2 /100 WBC Final   • RBC, UA 06/03/2021 0-2* None Seen /HPF Final   • WBC, UA 06/03/2021 3-5* None Seen /HPF Final   • Bacteria, UA 06/03/2021 1+* None Seen /HPF Final   • Squamous Epithelial Cells, UA 06/03/2021 7-12* None Seen, 0-2 /HPF Final   • Hyaline Casts, UA 06/03/2021 None Seen  None Seen /LPF Final   • Methodology 06/03/2021 Manual Light Microscopy   Final         Review of Systems   Constitutional: Negative.    HENT: Negative.    Respiratory: Negative.    Cardiovascular: Negative.    Gastrointestinal: Positive for diarrhea and nausea.    Genitourinary: Negative.    Musculoskeletal: Negative.    Neurological: Negative.    Psychiatric/Behavioral: Negative.        Objective   Physical Exam  Constitutional:       Appearance: Normal appearance.   HENT:      Head: Normocephalic.   Cardiovascular:      Rate and Rhythm: Normal rate and regular rhythm.      Pulses: Normal pulses.      Heart sounds: Normal heart sounds.   Pulmonary:      Effort: Pulmonary effort is normal.      Breath sounds: Normal breath sounds.   Abdominal:      General: Bowel sounds are normal.   Musculoskeletal:         General: Normal range of motion.   Skin:     General: Skin is warm and dry.   Neurological:      General: No focal deficit present.      Mental Status: She is alert and oriented to person, place, and time.   Psychiatric:         Mood and Affect: Mood normal.         Behavior: Behavior normal.         Procedures    Assessment/Plan   Diagnoses and all orders for this visit:    1. Nausea in adult (Primary)  -     Ambulatory Referral to Gastroenterology    Other orders  -     sucralfate (Carafate) 1 g tablet; Take 1 tablet by mouth 4 (Four) Times a Day.  Dispense: 60 tablet; Refill: 0          Current Outpatient Medications:   •  cefdinir (OMNICEF) 300 MG capsule, Take 1 capsule by mouth 2 (Two) Times a Day for 7 days., Disp: 14 capsule, Rfl: 0  •  drospirenone-ethinyl estradiol (CA,OCELLA) 3-0.03 MG per tablet, Take 1 tablet by mouth Daily., Disp: , Rfl:   •  lisinopril-hydrochlorothiazide (PRINZIDE,ZESTORETIC) 20-12.5 MG per tablet, Take 1 tablet by mouth once daily, Disp: 30 tablet, Rfl: 0  •  ondansetron ODT (ZOFRAN-ODT) 4 MG disintegrating tablet, Place 1 tablet on the tongue Every 8 (Eight) Hours As Needed for Nausea or Vomiting., Disp: 10 tablet, Rfl: 0  •  sucralfate (Carafate) 1 g tablet, Take 1 tablet by mouth 4 (Four) Times a Day., Disp: 60 tablet, Rfl: 0

## 2021-06-07 NOTE — PATIENT INSTRUCTIONS
Carafate 1 g 1-4 times a day as directed for nausea  Keep appointment with gastroenterology  Call OB/GYN to see if birth control pills could be causing your nausea

## 2021-06-13 RX ORDER — LISINOPRIL AND HYDROCHLOROTHIAZIDE 20; 12.5 MG/1; MG/1
TABLET ORAL
Qty: 30 TABLET | Refills: 0 | Status: SHIPPED | OUTPATIENT
Start: 2021-06-13 | End: 2021-07-14

## 2021-07-02 ENCOUNTER — HOSPITAL ENCOUNTER (EMERGENCY)
Facility: HOSPITAL | Age: 26
Discharge: HOME | End: 2021-07-03
Admitting: EMERGENCY MEDICINE

## 2021-07-02 DIAGNOSIS — R11.2 NON-INTRACTABLE VOMITING WITH NAUSEA, UNSPECIFIED VOMITING TYPE: Primary | ICD-10-CM

## 2021-07-02 PROCEDURE — 99283 EMERGENCY DEPT VISIT LOW MDM: CPT

## 2021-07-02 RX ORDER — SODIUM CHLORIDE 0.9 % (FLUSH) 0.9 %
10 SYRINGE (ML) INJECTION AS NEEDED
Status: DISCONTINUED | OUTPATIENT
Start: 2021-07-02 | End: 2021-07-03 | Stop reason: HOSPADM

## 2021-07-02 RX ORDER — ONDANSETRON 2 MG/ML
4 INJECTION INTRAMUSCULAR; INTRAVENOUS ONCE
Status: DISCONTINUED | OUTPATIENT
Start: 2021-07-02 | End: 2021-07-03

## 2021-07-03 VITALS
TEMPERATURE: 98.2 F | RESPIRATION RATE: 16 BRPM | DIASTOLIC BLOOD PRESSURE: 84 MMHG | HEART RATE: 91 BPM | BODY MASS INDEX: 36.75 KG/M2 | HEIGHT: 68 IN | OXYGEN SATURATION: 100 % | WEIGHT: 242.51 LBS | SYSTOLIC BLOOD PRESSURE: 150 MMHG

## 2021-07-03 LAB
ALBUMIN SERPL-MCNC: 4.2 G/DL (ref 3.5–5.2)
ALBUMIN/GLOB SERPL: 1.2 G/DL
ALP SERPL-CCNC: 61 U/L (ref 39–117)
ALT SERPL W P-5'-P-CCNC: 10 U/L (ref 1–33)
ANION GAP SERPL CALCULATED.3IONS-SCNC: 13 MMOL/L (ref 5–15)
AST SERPL-CCNC: 18 U/L (ref 1–32)
B-HCG UR QL: NEGATIVE
BACTERIA UR QL AUTO: ABNORMAL /HPF
BASOPHILS # BLD AUTO: 0.1 10*3/MM3 (ref 0–0.2)
BASOPHILS NFR BLD AUTO: 0.8 % (ref 0–1.5)
BILIRUB SERPL-MCNC: 0.6 MG/DL (ref 0–1.2)
BILIRUB UR QL STRIP: NEGATIVE
BUN SERPL-MCNC: 8 MG/DL (ref 6–20)
BUN/CREAT SERPL: 11.8 (ref 7–25)
CALCIUM SPEC-SCNC: 9.5 MG/DL (ref 8.6–10.5)
CHLORIDE SERPL-SCNC: 97 MMOL/L (ref 98–107)
CLARITY UR: CLEAR
CO2 SERPL-SCNC: 23 MMOL/L (ref 22–29)
COLOR UR: YELLOW
CREAT SERPL-MCNC: 0.68 MG/DL (ref 0.57–1)
DEPRECATED RDW RBC AUTO: 42 FL (ref 37–54)
EOSINOPHIL # BLD AUTO: 0.1 10*3/MM3 (ref 0–0.4)
EOSINOPHIL NFR BLD AUTO: 0.7 % (ref 0.3–6.2)
ERYTHROCYTE [DISTWIDTH] IN BLOOD BY AUTOMATED COUNT: 14.4 % (ref 12.3–15.4)
GFR SERPL CREATININE-BSD FRML MDRD: 105 ML/MIN/1.73
GLOBULIN UR ELPH-MCNC: 3.5 GM/DL
GLUCOSE SERPL-MCNC: 88 MG/DL (ref 65–99)
GLUCOSE UR STRIP-MCNC: NEGATIVE MG/DL
HCT VFR BLD AUTO: 38.7 % (ref 34–46.6)
HGB BLD-MCNC: 13.2 G/DL (ref 12–15.9)
HGB UR QL STRIP.AUTO: ABNORMAL
HOLD SPECIMEN: NORMAL
HYALINE CASTS UR QL AUTO: ABNORMAL /LPF
KETONES UR QL STRIP: NEGATIVE
LEUKOCYTE ESTERASE UR QL STRIP.AUTO: ABNORMAL
LIPASE SERPL-CCNC: 19 U/L (ref 13–60)
LYMPHOCYTES # BLD AUTO: 2.4 10*3/MM3 (ref 0.7–3.1)
LYMPHOCYTES NFR BLD AUTO: 21.3 % (ref 19.6–45.3)
MCH RBC QN AUTO: 28 PG (ref 26.6–33)
MCHC RBC AUTO-ENTMCNC: 34.1 G/DL (ref 31.5–35.7)
MCV RBC AUTO: 82.3 FL (ref 79–97)
MONOCYTES # BLD AUTO: 0.4 10*3/MM3 (ref 0.1–0.9)
MONOCYTES NFR BLD AUTO: 3.6 % (ref 5–12)
NEUTROPHILS NFR BLD AUTO: 73.6 % (ref 42.7–76)
NEUTROPHILS NFR BLD AUTO: 8.3 10*3/MM3 (ref 1.7–7)
NITRITE UR QL STRIP: NEGATIVE
NRBC BLD AUTO-RTO: 0.3 /100 WBC (ref 0–0.2)
PH UR STRIP.AUTO: 7 [PH] (ref 5–8)
PLATELET # BLD AUTO: 387 10*3/MM3 (ref 140–450)
PMV BLD AUTO: 9.6 FL (ref 6–12)
POTASSIUM SERPL-SCNC: 4.6 MMOL/L (ref 3.5–5.2)
PROT SERPL-MCNC: 7.7 G/DL (ref 6–8.5)
PROT UR QL STRIP: NEGATIVE
RBC # BLD AUTO: 4.7 10*6/MM3 (ref 3.77–5.28)
RBC # UR: ABNORMAL /HPF
RBC MORPH BLD: NORMAL
REF LAB TEST METHOD: ABNORMAL
SMALL PLATELETS BLD QL SMEAR: NORMAL
SODIUM SERPL-SCNC: 133 MMOL/L (ref 136–145)
SP GR UR STRIP: <=1.005 (ref 1–1.03)
SQUAMOUS #/AREA URNS HPF: ABNORMAL /HPF
UROBILINOGEN UR QL STRIP: ABNORMAL
WBC # BLD AUTO: 11.3 10*3/MM3 (ref 3.4–10.8)
WBC MORPH BLD: NORMAL
WBC UR QL AUTO: ABNORMAL /HPF

## 2021-07-03 PROCEDURE — 85007 BL SMEAR W/DIFF WBC COUNT: CPT | Performed by: NURSE PRACTITIONER

## 2021-07-03 PROCEDURE — 85025 COMPLETE CBC W/AUTO DIFF WBC: CPT | Performed by: NURSE PRACTITIONER

## 2021-07-03 PROCEDURE — 63710000001 ONDANSETRON ODT 4 MG TABLET DISPERSIBLE: Performed by: NURSE PRACTITIONER

## 2021-07-03 PROCEDURE — 87086 URINE CULTURE/COLONY COUNT: CPT | Performed by: NURSE PRACTITIONER

## 2021-07-03 PROCEDURE — 83690 ASSAY OF LIPASE: CPT | Performed by: NURSE PRACTITIONER

## 2021-07-03 PROCEDURE — 81001 URINALYSIS AUTO W/SCOPE: CPT | Performed by: NURSE PRACTITIONER

## 2021-07-03 PROCEDURE — 80053 COMPREHEN METABOLIC PANEL: CPT | Performed by: NURSE PRACTITIONER

## 2021-07-03 PROCEDURE — 81025 URINE PREGNANCY TEST: CPT | Performed by: NURSE PRACTITIONER

## 2021-07-03 RX ORDER — ONDANSETRON 4 MG/1
4 TABLET, ORALLY DISINTEGRATING ORAL ONCE
Status: COMPLETED | OUTPATIENT
Start: 2021-07-03 | End: 2021-07-03

## 2021-07-03 RX ORDER — ONDANSETRON 4 MG/1
4 TABLET, ORALLY DISINTEGRATING ORAL EVERY 6 HOURS PRN
Qty: 10 TABLET | Refills: 0 | Status: SHIPPED | OUTPATIENT
Start: 2021-07-03 | End: 2021-10-19

## 2021-07-03 RX ADMIN — ONDANSETRON 4 MG: 4 TABLET, ORALLY DISINTEGRATING ORAL at 00:44

## 2021-07-03 NOTE — ED PROVIDER NOTES
Subjective   Patient is a 25-year-old female who has had nausea with sporadic vomiting throughout the week she states that she has vomited 1-2 times daily since Tuesday.  She states that she had an edible yesterday but does not smoke marijuana daily.  She denies the possibility of being pregnant.  She states from time to time she has some random abdominal cramping but she does not have any at this time.  She has had no fever no chills no nausea vomiting or diarrhea.          Review of Systems   Constitutional: Negative for chills, fatigue and fever.   HENT: Negative for congestion, tinnitus and trouble swallowing.    Eyes: Negative for photophobia, discharge and redness.   Respiratory: Negative for cough and shortness of breath.    Cardiovascular: Negative for chest pain and palpitations.   Gastrointestinal: Positive for nausea. Negative for abdominal pain, diarrhea and vomiting.   Genitourinary: Negative for dysuria, frequency and urgency.   Musculoskeletal: Negative for back pain, joint swelling and myalgias.   Skin: Negative for rash.   Neurological: Negative for dizziness and headaches.   Psychiatric/Behavioral: Negative for confusion.   All other systems reviewed and are negative.      Past Medical History:   Diagnosis Date   • Hypertension    • PCOS (polycystic ovarian syndrome)        No Known Allergies    No past surgical history on file.    Family History   Problem Relation Age of Onset   • Hypertension Mother    • Diabetes Father        Social History     Socioeconomic History   • Marital status: Single     Spouse name: Not on file   • Number of children: Not on file   • Years of education: Not on file   • Highest education level: Not on file   Tobacco Use   • Smoking status: Never Smoker   • Smokeless tobacco: Never Used   Vaping Use   • Vaping Use: Never used   Substance and Sexual Activity   • Alcohol use: Never   • Drug use: Never   • Sexual activity: Yes     Partners: Male           Objective    Physical Exam  Vitals reviewed.   Constitutional:       Appearance: Normal appearance. She is well-developed. She is obese.   HENT:      Head: Normocephalic and atraumatic.      Right Ear: External ear normal.      Left Ear: External ear normal.      Nose: Nose normal.      Mouth/Throat:      Mouth: Mucous membranes are moist.   Eyes:      Conjunctiva/sclera: Conjunctivae normal.      Pupils: Pupils are equal, round, and reactive to light.   Cardiovascular:      Rate and Rhythm: Normal rate and regular rhythm.      Heart sounds: Normal heart sounds.   Pulmonary:      Effort: Pulmonary effort is normal. No respiratory distress.      Breath sounds: Normal breath sounds. No wheezing.   Abdominal:      General: Bowel sounds are normal. There is no distension.      Palpations: Abdomen is soft. There is no mass.      Tenderness: There is no abdominal tenderness. There is no guarding or rebound.   Musculoskeletal:         General: No deformity. Normal range of motion.      Cervical back: Normal range of motion and neck supple.   Skin:     General: Skin is warm and dry.      Capillary Refill: Capillary refill takes less than 2 seconds.   Neurological:      General: No focal deficit present.      Mental Status: She is alert and oriented to person, place, and time.      GCS: GCS eye subscore is 4. GCS verbal subscore is 5. GCS motor subscore is 6.      Cranial Nerves: No cranial nerve deficit.      Sensory: No sensory deficit.      Deep Tendon Reflexes: Reflexes normal.   Psychiatric:         Attention and Perception: Attention normal.         Mood and Affect: Mood normal.         Speech: Speech normal.         Behavior: Behavior normal. Behavior is cooperative.         Thought Content: Thought content normal.         Cognition and Memory: Cognition and memory normal.         Judgment: Judgment normal.         Procedures           ED Course      .vs  Labs Reviewed   COMPREHENSIVE METABOLIC PANEL - Abnormal; Notable for the  following components:       Result Value    Sodium 133 (*)     Chloride 97 (*)     All other components within normal limits    Narrative:     GFR Normal >60  Chronic Kidney Disease <60  Kidney Failure <15     URINALYSIS W/ CULTURE IF INDICATED - Abnormal; Notable for the following components:    Blood, UA Trace (*)     Leuk Esterase, UA Small (1+) (*)     All other components within normal limits   CBC WITH AUTO DIFFERENTIAL - Abnormal; Notable for the following components:    WBC 11.30 (*)     Monocyte % 3.6 (*)     Neutrophils, Absolute 8.30 (*)     nRBC 0.3 (*)     All other components within normal limits   URINALYSIS, MICROSCOPIC ONLY - Abnormal; Notable for the following components:    RBC, UA 3-5 (*)     WBC, UA 6-12 (*)     Bacteria, UA Trace (*)     Squamous Epithelial Cells, UA 7-12 (*)     All other components within normal limits   PREGNANCY, URINE - Normal   LIPASE - Normal   URINE CULTURE   SCAN SLIDE   CBC AND DIFFERENTIAL    Narrative:     The following orders were created for panel order CBC & Differential.  Procedure                               Abnormality         Status                     ---------                               -----------         ------                     Scan Slide[[id number]]                                       Final result               CBC Auto Differential[[id number]]        Abnormal            Final result                 Please view results for these tests on the individual orders.   EXTRA TUBES    Narrative:     The following orders were created for panel order Extra Tubes.  Procedure                               Abnormality         Status                     ---------                               -----------         ------                     Gold Top - SST[[id number]]                                   In process                   Please view results for these tests on the individual orders.   GOLD TOP - SST     Medications   sodium chloride 0.9 % flush 10 mL (has  no administration in time range)   ondansetron ODT (ZOFRAN-ODT) disintegrating tablet 4 mg (4 mg Oral Given 7/3/21 0044)     No radiology results for the last day                                       MDM  Number of Diagnoses or Management Options  Non-intractable vomiting with nausea, unspecified vomiting type  Diagnosis management comments: There was difficulty establishing the patient's IV but blood work was obtained and found to be within normal limits patient is not dehydrated.  Patient was given Zofran ODT and p.o. challenge which was successful she had successful reduction of her nausea.  She will be sent home with some Zofran she was advised to follow-up with gastroenterology as scheduled and her primary care provider and to return if worse she verbalized understood discharge instructions       Amount and/or Complexity of Data Reviewed  Clinical lab tests: reviewed    Risk of Complications, Morbidity, and/or Mortality  Presenting problems: minimal  Diagnostic procedures: low  Management options: low    Patient Progress  Patient progress: improved      Final diagnoses:   Non-intractable vomiting with nausea, unspecified vomiting type       ED Disposition  ED Disposition     ED Disposition Condition Comment    Discharge Stable           Colleen Mcelroy, APRN  [address]  [facility] [address]  [place] [address] [phone]    In 3 days  If symptoms worsen, As needed         Medication List      New Prescriptions    ondansetron ODT 4 MG disintegrating tablet  Commonly known as: Zofran ODT  Place 1 tablet on the tongue Every 6 (Six) Hours As Needed for Nausea or Vomiting.           Where to Get Your Medications      These medications were sent to [facility] [address] [place] [address] [place] - [address] [facility] - [phone]  - [phone] FX  [address] [facility] [place] [address]    Phone: [phone]   · ondansetron ODT 4 MG disintegrating tablet          Dayanna De Anda, APRN  07/03/21 0055

## 2021-07-03 NOTE — DISCHARGE INSTRUCTIONS
Push clear liquids    Avoid fried fatty spicy foods    Use Zofran as needed for nausea    Follow-up with primary care for any further problems or return to the ER if worse    Your appointment with gastroenterology as scheduled

## 2021-07-04 LAB — BACTERIA SPEC AEROBE CULT: NORMAL

## 2021-07-06 ENCOUNTER — OFFICE VISIT (OUTPATIENT)
Dept: FAMILY MEDICINE CLINIC | Facility: CLINIC | Age: 26
End: 2021-07-06

## 2021-07-06 VITALS
HEART RATE: 140 BPM | HEIGHT: 68 IN | BODY MASS INDEX: 36.25 KG/M2 | WEIGHT: 239.2 LBS | OXYGEN SATURATION: 99 % | DIASTOLIC BLOOD PRESSURE: 89 MMHG | TEMPERATURE: 98.4 F | SYSTOLIC BLOOD PRESSURE: 124 MMHG

## 2021-07-06 DIAGNOSIS — E66.09 CLASS 2 OBESITY DUE TO EXCESS CALORIES WITHOUT SERIOUS COMORBIDITY WITH BODY MASS INDEX (BMI) OF 36.0 TO 36.9 IN ADULT: ICD-10-CM

## 2021-07-06 DIAGNOSIS — R00.0 TACHYCARDIA: ICD-10-CM

## 2021-07-06 DIAGNOSIS — R11.0 NAUSEA IN ADULT: ICD-10-CM

## 2021-07-06 DIAGNOSIS — R10.11 RIGHT UPPER QUADRANT ABDOMINAL PAIN: Primary | ICD-10-CM

## 2021-07-06 DIAGNOSIS — R11.0 NAUSEA: ICD-10-CM

## 2021-07-06 PROBLEM — E66.9 OBESITY: Status: RESOLVED | Noted: 2021-02-08 | Resolved: 2021-07-06

## 2021-07-06 PROBLEM — R10.30 LOWER ABDOMINAL PAIN: Status: ACTIVE | Noted: 2021-07-06

## 2021-07-06 PROBLEM — E66.812 CLASS 2 OBESITY DUE TO EXCESS CALORIES WITHOUT SERIOUS COMORBIDITY WITH BODY MASS INDEX (BMI) OF 36.0 TO 36.9 IN ADULT: Status: ACTIVE | Noted: 2021-07-06

## 2021-07-06 PROCEDURE — 99214 OFFICE O/P EST MOD 30 MIN: CPT | Performed by: NURSE PRACTITIONER

## 2021-07-06 RX ORDER — PROMETHAZINE HYDROCHLORIDE 12.5 MG/1
TABLET ORAL
Qty: 60 TABLET | Refills: 2 | Status: SHIPPED | OUTPATIENT
Start: 2021-07-06 | End: 2021-10-19

## 2021-07-06 NOTE — PATIENT INSTRUCTIONS
Blood work  Gallbladder ultrasound  Keep appointment with gastroenterology  Phenergan 12.5 mg every 6 hours as needed nausea

## 2021-07-06 NOTE — PROGRESS NOTES
"    Meeta Garcia is a 25 y.o. female.     25-year-old obese white female with history of PCOS, headaches and hypertension who has been complaining about nausea and abdominal pain for several months now.  She was recently placed on Guillermina by her OB/GYN and OB/GYN states his nausea certainly can be coming from this hormone however not necessarily abdominal pain.  Her pelvic exam was within normal limits.  Patient still continues of mid abdominal pain and persistent nausea to the point that she is lost 16 pounds since June 7 with a weight of 239 and a BMI of 36.4.  Patient does have an upcoming appointment with gastroenterology end of this month but is requesting a gallbladder scan.  She states the pain is worse after eating and she has had some intermittent right-sided pain but most of it has been mid abdominal.  I do not think this is gallbladder however will we do the scan and she can take the results to her gastroenterology appointment  Blood pressure 124/88 heart rate 132 she denies any chest pain, dyspnea, tachycardia or dizziness.   I am ordering thyroid levels    Blood work  Gallbladder ultrasound  Keep appointment with gastroenterology  Phenergan 12.5 mg every 6 hours as needed nausea       The following portions of the patient's history were reviewed and updated as appropriate: allergies, current medications, past family history, past medical history, past social history, past surgical history and problem list.    Vitals:    07/06/21 1408   BP: 124/89   BP Location: Right arm   Patient Position: Sitting   Cuff Size: Large Adult   Pulse: (!) 140   Temp: 98.4 °F (36.9 °C)   TempSrc: Temporal   SpO2: 99%   Weight: 109 kg (239 lb 3.2 oz)   Height: 172.7 cm (68\")     Body mass index is 36.37 kg/m².    Past Medical History:   Diagnosis Date   • Hypertension    • PCOS (polycystic ovarian syndrome)      No past surgical history on file.  Family History   Problem Relation Age of Onset   • Hypertension Mother    • " Diabetes Father      Immunization History   Administered Date(s) Administered   • DTP / HiB 02/13/1996, 04/16/1996   • DTaP, Unspecified 09/09/1999   • Flu Mist 08/27/2013   • Flulaval/Fluarix/Fluzone Quad 09/01/2020   • HPV Quadrivalent 09/08/2010, 03/10/2011   • Hepatitis A 10/29/2018   • Influenza LAIV (Nasal) 01/24/2013   • Influenza, Unspecified 10/29/2018   • MMR 09/09/1999   • OPV 02/13/1996, 04/16/1996, 09/09/1999   • Tdap 04/16/2018       Admission on 07/02/2021, Discharged on 07/03/2021   Component Date Value Ref Range Status   • Glucose 07/03/2021 88  65 - 99 mg/dL Final   • BUN 07/03/2021 8  6 - 20 mg/dL Final   • Creatinine 07/03/2021 0.68  0.57 - 1.00 mg/dL Final   • Sodium 07/03/2021 133* 136 - 145 mmol/L Final   • Potassium 07/03/2021 4.6  3.5 - 5.2 mmol/L Final    Slight hemolysis detected by analyzer. Results may be affected.   • Chloride 07/03/2021 97* 98 - 107 mmol/L Final   • CO2 07/03/2021 23.0  22.0 - 29.0 mmol/L Final   • Calcium 07/03/2021 9.5  8.6 - 10.5 mg/dL Final   • Total Protein 07/03/2021 7.7  6.0 - 8.5 g/dL Final   • Albumin 07/03/2021 4.20  3.50 - 5.20 g/dL Final   • ALT (SGPT) 07/03/2021 10  1 - 33 U/L Final   • AST (SGOT) 07/03/2021 18  1 - 32 U/L Final    Slight hemolysis detected by analyzer. Results may be affected.   • Alkaline Phosphatase 07/03/2021 61  39 - 117 U/L Final   • Total Bilirubin 07/03/2021 0.6  0.0 - 1.2 mg/dL Final   • eGFR Non African Amer 07/03/2021 105  >60 mL/min/1.73 Final   • Globulin 07/03/2021 3.5  gm/dL Final   • A/G Ratio 07/03/2021 1.2  g/dL Final   • BUN/Creatinine Ratio 07/03/2021 11.8  7.0 - 25.0 Final   • Anion Gap 07/03/2021 13.0  5.0 - 15.0 mmol/L Final   • HCG, Urine QL 07/03/2021 Negative  Negative Final   • Lipase 07/03/2021 19  13 - 60 U/L Final   • Color, UA 07/03/2021 Yellow  Yellow, Straw Final   • Appearance, UA 07/03/2021 Clear  Clear Final   • pH, UA 07/03/2021 7.0  5.0 - 8.0 Final   • Specific Gravity, UA 07/03/2021 <=1.005  1.005 -  1.030 Final   • Glucose, UA 07/03/2021 Negative  Negative Final   • Ketones, UA 07/03/2021 Negative  Negative Final   • Bilirubin, UA 07/03/2021 Negative  Negative Final   • Blood, UA 07/03/2021 Trace* Negative Final   • Protein, UA 07/03/2021 Negative  Negative Final   • Leuk Esterase, UA 07/03/2021 Small (1+)* Negative Final   • Nitrite, UA 07/03/2021 Negative  Negative Final   • Urobilinogen, UA 07/03/2021 0.2 E.U./dL  0.2 - 1.0 E.U./dL Final   • WBC 07/03/2021 11.30* 3.40 - 10.80 10*3/mm3 Final   • RBC 07/03/2021 4.70  3.77 - 5.28 10*6/mm3 Final   • Hemoglobin 07/03/2021 13.2  12.0 - 15.9 g/dL Final   • Hematocrit 07/03/2021 38.7  34.0 - 46.6 % Final   • MCV 07/03/2021 82.3  79.0 - 97.0 fL Final   • MCH 07/03/2021 28.0  26.6 - 33.0 pg Final   • MCHC 07/03/2021 34.1  31.5 - 35.7 g/dL Final   • RDW 07/03/2021 14.4  12.3 - 15.4 % Final   • RDW-SD 07/03/2021 42.0  37.0 - 54.0 fl Final   • MPV 07/03/2021 9.6  6.0 - 12.0 fL Final   • Platelets 07/03/2021 387  140 - 450 10*3/mm3 Final   • Neutrophil % 07/03/2021 73.6  42.7 - 76.0 % Final   • Lymphocyte % 07/03/2021 21.3  19.6 - 45.3 % Final   • Monocyte % 07/03/2021 3.6* 5.0 - 12.0 % Final   • Eosinophil % 07/03/2021 0.7  0.3 - 6.2 % Final   • Basophil % 07/03/2021 0.8  0.0 - 1.5 % Final   • Neutrophils, Absolute 07/03/2021 8.30* 1.70 - 7.00 10*3/mm3 Final   • Lymphocytes, Absolute 07/03/2021 2.40  0.70 - 3.10 10*3/mm3 Final   • Monocytes, Absolute 07/03/2021 0.40  0.10 - 0.90 10*3/mm3 Final   • Eosinophils, Absolute 07/03/2021 0.10  0.00 - 0.40 10*3/mm3 Final   • Basophils, Absolute 07/03/2021 0.10  0.00 - 0.20 10*3/mm3 Final   • nRBC 07/03/2021 0.3* 0.0 - 0.2 /100 WBC Final   • Extra Tube 07/03/2021 Hold for add-ons.   Final    Auto resulted.   • RBC, UA 07/03/2021 3-5* None Seen /HPF Final   • WBC, UA 07/03/2021 6-12* None Seen /HPF Final   • Bacteria, UA 07/03/2021 Trace* None Seen /HPF Final   • Squamous Epithelial Cells, UA 07/03/2021 7-12* None Seen, 0-2 /HPF  Final   • Hyaline Casts, UA 07/03/2021 0-2  None Seen /LPF Final   • Methodology 07/03/2021 Manual Light Microscopy   Final   • RBC Morphology 07/03/2021 Normal  Normal Final   • WBC Morphology 07/03/2021 Normal  Normal Final   • Platelet Estimate 07/03/2021 Increased  Normal Final   • Urine Culture 07/03/2021 >100,000 CFU/mL Mixed Sonja Isolated   Final         Review of Systems   Constitutional: Positive for unexpected weight loss.   HENT: Negative.    Respiratory: Negative.    Cardiovascular: Positive for palpitations.   Gastrointestinal: Positive for abdominal pain and nausea.   Genitourinary: Negative.    Musculoskeletal: Negative.    Skin: Negative.    Neurological: Negative.    Psychiatric/Behavioral: Negative.        Objective   Physical Exam  Constitutional:       Appearance: Normal appearance.   Cardiovascular:      Rate and Rhythm: Tachycardia present.      Pulses: Normal pulses.      Heart sounds: Normal heart sounds.   Pulmonary:      Effort: Pulmonary effort is normal.      Breath sounds: Normal breath sounds.   Abdominal:      General: Bowel sounds are normal.   Skin:     General: Skin is warm and dry.   Neurological:      General: No focal deficit present.      Mental Status: She is alert and oriented to person, place, and time.   Psychiatric:         Mood and Affect: Mood normal.         Behavior: Behavior normal.         Procedures    Assessment/Plan   Diagnoses and all orders for this visit:    1. Right upper quadrant abdominal pain (Primary)  -     US Gallbladder; Future    2. Tachycardia  -     TSH+Free T4; Future  -     T3; Future    3. Nausea  -     Amylase; Future    4. Class 2 obesity due to excess calories without serious comorbidity with body mass index (BMI) of 36.0 to 36.9 in adult    5. Nausea in adult    Other orders  -     promethazine (PHENERGAN) 12.5 MG tablet; 1-2 q6h prn  Dispense: 60 tablet; Refill: 2          Current Outpatient Medications:   •  drospirenone-ethinyl estradiol  (CA,OCELLA) 3-0.03 MG per tablet, Take 1 tablet by mouth Daily., Disp: , Rfl:   •  lisinopril-hydrochlorothiazide (PRINZIDE,ZESTORETIC) 20-12.5 MG per tablet, Take 1 tablet by mouth once daily, Disp: 30 tablet, Rfl: 0  •  ondansetron ODT (Zofran ODT) 4 MG disintegrating tablet, Place 1 tablet on the tongue Every 6 (Six) Hours As Needed for Nausea or Vomiting., Disp: 10 tablet, Rfl: 0  •  sucralfate (Carafate) 1 g tablet, Take 1 tablet by mouth 4 (Four) Times a Day., Disp: 60 tablet, Rfl: 0  •  promethazine (PHENERGAN) 12.5 MG tablet, 1-2 q6h prn, Disp: 60 tablet, Rfl: 2

## 2021-07-09 DIAGNOSIS — R10.11 RIGHT UPPER QUADRANT ABDOMINAL PAIN: ICD-10-CM

## 2021-07-14 RX ORDER — LISINOPRIL AND HYDROCHLOROTHIAZIDE 20; 12.5 MG/1; MG/1
TABLET ORAL
Qty: 30 TABLET | Refills: 0 | Status: SHIPPED | OUTPATIENT
Start: 2021-07-14 | End: 2021-08-16

## 2021-08-16 RX ORDER — LISINOPRIL AND HYDROCHLOROTHIAZIDE 20; 12.5 MG/1; MG/1
1 TABLET ORAL DAILY
Qty: 90 TABLET | Refills: 1 | Status: SHIPPED | OUTPATIENT
Start: 2021-08-16 | End: 2022-02-15

## 2021-08-16 RX ORDER — LISINOPRIL AND HYDROCHLOROTHIAZIDE 20; 12.5 MG/1; MG/1
TABLET ORAL
Qty: 30 TABLET | Refills: 0 | Status: SHIPPED | OUTPATIENT
Start: 2021-08-16 | End: 2021-08-16 | Stop reason: SDUPTHER

## 2021-08-31 ENCOUNTER — OFFICE VISIT (OUTPATIENT)
Dept: SURGERY | Facility: CLINIC | Age: 26
End: 2021-08-31

## 2021-08-31 VITALS
BODY MASS INDEX: 35.01 KG/M2 | HEART RATE: 100 BPM | WEIGHT: 231 LBS | DIASTOLIC BLOOD PRESSURE: 96 MMHG | OXYGEN SATURATION: 100 % | TEMPERATURE: 96.8 F | HEIGHT: 68 IN | SYSTOLIC BLOOD PRESSURE: 137 MMHG

## 2021-08-31 DIAGNOSIS — K80.20 SYMPTOMATIC CHOLELITHIASIS: Primary | ICD-10-CM

## 2021-08-31 PROCEDURE — 99204 OFFICE O/P NEW MOD 45 MIN: CPT | Performed by: SURGERY

## 2021-08-31 RX ORDER — OMEPRAZOLE 40 MG/1
40 CAPSULE, DELAYED RELEASE ORAL
COMMUNITY
Start: 2021-08-09 | End: 2021-10-19

## 2021-08-31 RX ORDER — SODIUM CHLORIDE 9 MG/ML
100 INJECTION, SOLUTION INTRAVENOUS CONTINUOUS
Status: CANCELLED | OUTPATIENT
Start: 2021-08-31

## 2021-08-31 RX ORDER — CHOLECALCIFEROL (VITAMIN D3) 125 MCG
10 CAPSULE ORAL
COMMUNITY

## 2021-08-31 NOTE — PROGRESS NOTES
"Subjective   Meeta Garcia is a 25 y.o. female.   Chief Complaint   Patient presents with   • Cholelithiasis     New patient referral Dr. Hdez     /96 (BP Location: Right arm, Patient Position: Sitting, Cuff Size: Adult)   Pulse 100   Temp 96.8 °F (36 °C) (Infrared)   Ht 172.7 cm (68\")   Wt 105 kg (231 lb)   SpO2 100%   BMI 35.12 kg/m²     HISTORY OF PRESENT ILLNESS:  25-year-old lady has been referred to me for evaluation of epigastric abdominal discomfort and nausea.  She has had symptoms about 3 to 4 months.  She says that it is worse when she eats spicy foods occasionally ground beef or fried foods.  She has nausea and vomiting.  She had 1 severe episode of epigastric abdominal discomfort.  She has seen gastroenterology who did upper endoscopy placed on a PPI with mild relief.  She says that she has about 3 bowel movements a day and some intermittent constipation.  As part of her work-up she had LFTs which were normal and a right upper quadrant ultrasound which showed a large gallstone within the gallbladder.  There is no Robles sign and the gallbladder wall was not thickened.  There was some possible hepatic steatosis.      Outpatient Encounter Medications as of 8/31/2021   Medication Sig Dispense Refill   • drospirenone-ethinyl estradiol (CA,OCELLA) 3-0.03 MG per tablet Take 1 tablet by mouth Daily.     • lisinopril-hydrochlorothiazide (PRINZIDE,ZESTORETIC) 20-12.5 MG per tablet Take 1 tablet by mouth Daily. 90 tablet 1   • melatonin 5 MG tablet tablet Take 10 mg by mouth. 2qhs     • omeprazole (priLOSEC) 40 MG capsule TAKE 1 CAPSULE BY MOUTH ONCE DAILY FOR 30 DAYS     • ondansetron ODT (Zofran ODT) 4 MG disintegrating tablet Place 1 tablet on the tongue Every 6 (Six) Hours As Needed for Nausea or Vomiting. 10 tablet 0   • promethazine (PHENERGAN) 12.5 MG tablet 1-2 q6h prn 60 tablet 2   • [DISCONTINUED] sucralfate (Carafate) 1 g tablet Take 1 tablet by mouth 4 (Four) Times a Day. 60 tablet " 0     No facility-administered encounter medications on file as of 8/31/2021.         The following portions of the patient's history were reviewed and updated as appropriate: allergies, current medications, past family history, past medical history, past social history, past surgical history and problem list.    Review of Systems    A complete review of systems has been obtained positive for abdominal pain nausea vomiting and the remainder of the review of systems is negative    Objective   Physical Exam  Constitutional:       Appearance: Normal appearance. She is well-developed.   HENT:      Head: Normocephalic and atraumatic.   Eyes:      General: No scleral icterus.     Conjunctiva/sclera: Conjunctivae normal.   Neck:      Trachea: No tracheal deviation.   Cardiovascular:      Rate and Rhythm: Normal rate.      Pulses: Normal pulses.   Pulmonary:      Effort: Pulmonary effort is normal. No respiratory distress.   Abdominal:      General: There is no distension.      Palpations: Abdomen is soft.   Musculoskeletal:         General: No swelling or tenderness.      Cervical back: Neck supple. No tenderness. No muscular tenderness.   Skin:     General: Skin is warm and dry.   Neurological:      General: No focal deficit present.      Mental Status: She is alert. Mental status is at baseline.   Psychiatric:         Mood and Affect: Mood normal.         Behavior: Behavior normal.           Assessment/Plan   Diagnoses and all orders for this visit:    1. Symptomatic cholelithiasis (Primary)  -     Case Request; Standing  -     sodium chloride 0.9 % infusion  -     Case Request    Other orders  -     Follow Anesthesia Guidelines / Standing Orders; Future  -     Provide NPO Instructions to Patient; Future  -     Chlorhexidine Skin Prep; Future  -     Follow Anesthesia Guidelines / Standing Orders; Standing  -     Verify NPO Status; Standing  -     Obtain Informed Consent; Standing  -     SCD (Sequential Compression  Device) - Place on Patient in Pre-Op; Standing  -     Clip Operative Site; Standing  -     Have Patient Void Prior to Procedure; Standing  -     Instructions on Coughing, Deep Breathing & Incentive Spirometry; Standing  -     Notify Physician - Standard; Standing    I have counseled her about the likely diagnosis of symptomatic cholelithiasis peer we talked about the natural history of gallstones the risk and benefits of observation versus surgery.  Talked with the steps of the operation anticipated recovery the possible complications.  After discussing surgical management of symptomatic cholelithiasis she understands and wishes to proceed.    This is a chronic problem with counseling about major abdominal surgery without significant risk factors for morbidity.    Clayton Gibson MD  8/31/2021  9:29 AM EDT    This note was created using Dragon Voice Recognition software.

## 2021-09-03 ENCOUNTER — HOSPITAL ENCOUNTER (OUTPATIENT)
Dept: CARDIOLOGY | Facility: HOSPITAL | Age: 26
Discharge: HOME | End: 2021-09-03

## 2021-09-03 ENCOUNTER — LAB (OUTPATIENT)
Dept: LAB | Facility: HOSPITAL | Age: 26
End: 2021-09-03

## 2021-09-03 LAB
ANION GAP SERPL CALCULATED.3IONS-SCNC: 11.8 MMOL/L (ref 5–15)
BUN SERPL-MCNC: 8 MG/DL (ref 6–20)
BUN/CREAT SERPL: 11.9 (ref 7–25)
CALCIUM SPEC-SCNC: 9.8 MG/DL (ref 8.6–10.5)
CHLORIDE SERPL-SCNC: 100 MMOL/L (ref 98–107)
CO2 SERPL-SCNC: 25.2 MMOL/L (ref 22–29)
CREAT SERPL-MCNC: 0.67 MG/DL (ref 0.57–1)
DEPRECATED RDW RBC AUTO: 42.2 FL (ref 37–54)
ERYTHROCYTE [DISTWIDTH] IN BLOOD BY AUTOMATED COUNT: 13.5 % (ref 12.3–15.4)
GFR SERPL CREATININE-BSD FRML MDRD: 107 ML/MIN/1.73
GLUCOSE SERPL-MCNC: 82 MG/DL (ref 65–99)
HCT VFR BLD AUTO: 38.3 % (ref 34–46.6)
HGB BLD-MCNC: 12.4 G/DL (ref 12–15.9)
MCH RBC QN AUTO: 27.8 PG (ref 26.6–33)
MCHC RBC AUTO-ENTMCNC: 32.4 G/DL (ref 31.5–35.7)
MCV RBC AUTO: 85.9 FL (ref 79–97)
PLATELET # BLD AUTO: 441 10*3/MM3 (ref 140–450)
PMV BLD AUTO: 12 FL (ref 6–12)
POTASSIUM SERPL-SCNC: 3.9 MMOL/L (ref 3.5–5.2)
QT INTERVAL: 353 MS
RBC # BLD AUTO: 4.46 10*6/MM3 (ref 3.77–5.28)
SODIUM SERPL-SCNC: 137 MMOL/L (ref 136–145)
WBC # BLD AUTO: 12.47 10*3/MM3 (ref 3.4–10.8)

## 2021-09-03 PROCEDURE — 85027 COMPLETE CBC AUTOMATED: CPT

## 2021-09-03 PROCEDURE — 80048 BASIC METABOLIC PNL TOTAL CA: CPT

## 2021-09-03 PROCEDURE — 93005 ELECTROCARDIOGRAM TRACING: CPT | Performed by: SURGERY

## 2021-09-03 PROCEDURE — 93010 ELECTROCARDIOGRAM REPORT: CPT | Performed by: INTERNAL MEDICINE

## 2021-09-10 ENCOUNTER — LAB (OUTPATIENT)
Dept: LAB | Facility: HOSPITAL | Age: 26
End: 2021-09-10

## 2021-09-10 LAB — SARS-COV-2 ORF1AB RESP QL NAA+PROBE: NOT DETECTED

## 2021-09-10 PROCEDURE — U0004 COV-19 TEST NON-CDC HGH THRU: HCPCS

## 2021-09-10 PROCEDURE — C9803 HOPD COVID-19 SPEC COLLECT: HCPCS

## 2021-09-12 ENCOUNTER — ANESTHESIA EVENT (OUTPATIENT)
Dept: PERIOP | Facility: HOSPITAL | Age: 26
End: 2021-09-12

## 2021-09-13 ENCOUNTER — ANESTHESIA (OUTPATIENT)
Dept: PERIOP | Facility: HOSPITAL | Age: 26
End: 2021-09-13

## 2021-09-13 ENCOUNTER — HOSPITAL ENCOUNTER (OUTPATIENT)
Facility: HOSPITAL | Age: 26
Setting detail: HOSPITAL OUTPATIENT SURGERY
Discharge: HOME | End: 2021-09-13
Attending: SURGERY | Admitting: SURGERY

## 2021-09-13 VITALS
OXYGEN SATURATION: 95 % | HEART RATE: 83 BPM | DIASTOLIC BLOOD PRESSURE: 80 MMHG | HEIGHT: 68 IN | SYSTOLIC BLOOD PRESSURE: 145 MMHG | BODY MASS INDEX: 34.88 KG/M2 | WEIGHT: 230.16 LBS | RESPIRATION RATE: 16 BRPM | TEMPERATURE: 97.3 F

## 2021-09-13 DIAGNOSIS — K80.20 SYMPTOMATIC CHOLELITHIASIS: ICD-10-CM

## 2021-09-13 LAB — B-HCG UR QL: NEGATIVE

## 2021-09-13 PROCEDURE — 81025 URINE PREGNANCY TEST: CPT | Performed by: SURGERY

## 2021-09-13 PROCEDURE — 47562 LAPAROSCOPIC CHOLECYSTECTOMY: CPT | Performed by: SURGERY

## 2021-09-13 PROCEDURE — C1889 IMPLANT/INSERT DEVICE, NOC: HCPCS | Performed by: SURGERY

## 2021-09-13 PROCEDURE — 25010000002 KETOROLAC TROMETHAMINE PER 15 MG

## 2021-09-13 PROCEDURE — 25010000002 FENTANYL CITRATE (PF) 50 MCG/ML SOLUTION

## 2021-09-13 PROCEDURE — 47562 LAPAROSCOPIC CHOLECYSTECTOMY: CPT | Performed by: NURSE PRACTITIONER

## 2021-09-13 PROCEDURE — 88304 TISSUE EXAM BY PATHOLOGIST: CPT | Performed by: SURGERY

## 2021-09-13 PROCEDURE — 25010000002 FENTANYL CITRATE (PF) 100 MCG/2ML SOLUTION

## 2021-09-13 PROCEDURE — 25010000002 ONDANSETRON PER 1 MG

## 2021-09-13 PROCEDURE — 25010000002 PROPOFOL 10 MG/ML EMULSION

## 2021-09-13 PROCEDURE — 25010000002 MIDAZOLAM PER 1 MG

## 2021-09-13 PROCEDURE — 25010000002 DEXAMETHASONE PER 1 MG

## 2021-09-13 DEVICE — SEAL HEMO SURG ARISTA/AH ABS/PWDR 3GM: Type: IMPLANTABLE DEVICE | Site: ABDOMEN | Status: FUNCTIONAL

## 2021-09-13 DEVICE — LIGAMAX 5 MM ENDOSCOPIC MULTIPLE CLIP APPLIER
Type: IMPLANTABLE DEVICE | Site: ABDOMEN | Status: FUNCTIONAL
Brand: LIGAMAX

## 2021-09-13 RX ORDER — NALOXONE HCL 0.4 MG/ML
0.4 VIAL (ML) INJECTION AS NEEDED
Status: DISCONTINUED | OUTPATIENT
Start: 2021-09-13 | End: 2021-09-13 | Stop reason: HOSPADM

## 2021-09-13 RX ORDER — MEPERIDINE HYDROCHLORIDE 25 MG/ML
12.5 INJECTION INTRAMUSCULAR; INTRAVENOUS; SUBCUTANEOUS
Status: DISCONTINUED | OUTPATIENT
Start: 2021-09-13 | End: 2021-09-13 | Stop reason: HOSPADM

## 2021-09-13 RX ORDER — LIDOCAINE HYDROCHLORIDE AND EPINEPHRINE 10; 10 MG/ML; UG/ML
INJECTION, SOLUTION INFILTRATION; PERINEURAL AS NEEDED
Status: DISCONTINUED | OUTPATIENT
Start: 2021-09-13 | End: 2021-09-13 | Stop reason: HOSPADM

## 2021-09-13 RX ORDER — HYDROMORPHONE HCL 110MG/55ML
0.5 PATIENT CONTROLLED ANALGESIA SYRINGE INTRAVENOUS
Status: DISCONTINUED | OUTPATIENT
Start: 2021-09-13 | End: 2021-09-13 | Stop reason: HOSPADM

## 2021-09-13 RX ORDER — SODIUM CHLORIDE 9 MG/ML
100 INJECTION, SOLUTION INTRAVENOUS CONTINUOUS
Status: DISCONTINUED | OUTPATIENT
Start: 2021-09-13 | End: 2021-09-13 | Stop reason: HOSPADM

## 2021-09-13 RX ORDER — ROCURONIUM BROMIDE 10 MG/ML
INJECTION, SOLUTION INTRAVENOUS AS NEEDED
Status: DISCONTINUED | OUTPATIENT
Start: 2021-09-13 | End: 2021-09-13 | Stop reason: SURG

## 2021-09-13 RX ORDER — ACETAMINOPHEN 325 MG/1
650 TABLET ORAL ONCE AS NEEDED
Status: DISCONTINUED | OUTPATIENT
Start: 2021-09-13 | End: 2021-09-13 | Stop reason: HOSPADM

## 2021-09-13 RX ORDER — ONDANSETRON 2 MG/ML
INJECTION INTRAMUSCULAR; INTRAVENOUS AS NEEDED
Status: DISCONTINUED | OUTPATIENT
Start: 2021-09-13 | End: 2021-09-13 | Stop reason: SURG

## 2021-09-13 RX ORDER — DEXAMETHASONE SODIUM PHOSPHATE 4 MG/ML
INJECTION, SOLUTION INTRA-ARTICULAR; INTRALESIONAL; INTRAMUSCULAR; INTRAVENOUS; SOFT TISSUE AS NEEDED
Status: DISCONTINUED | OUTPATIENT
Start: 2021-09-13 | End: 2021-09-13 | Stop reason: SURG

## 2021-09-13 RX ORDER — FENTANYL CITRATE 50 UG/ML
50 INJECTION, SOLUTION INTRAMUSCULAR; INTRAVENOUS
Status: DISCONTINUED | OUTPATIENT
Start: 2021-09-13 | End: 2021-09-13 | Stop reason: HOSPADM

## 2021-09-13 RX ORDER — SODIUM CHLORIDE 9 MG/ML
INJECTION, SOLUTION INTRAVENOUS AS NEEDED
Status: DISCONTINUED | OUTPATIENT
Start: 2021-09-13 | End: 2021-09-13 | Stop reason: HOSPADM

## 2021-09-13 RX ORDER — MIDAZOLAM HYDROCHLORIDE 1 MG/ML
INJECTION INTRAMUSCULAR; INTRAVENOUS AS NEEDED
Status: DISCONTINUED | OUTPATIENT
Start: 2021-09-13 | End: 2021-09-13 | Stop reason: SURG

## 2021-09-13 RX ORDER — HYDROMORPHONE HCL 110MG/55ML
0.25 PATIENT CONTROLLED ANALGESIA SYRINGE INTRAVENOUS
Status: DISCONTINUED | OUTPATIENT
Start: 2021-09-13 | End: 2021-09-13 | Stop reason: HOSPADM

## 2021-09-13 RX ORDER — HYDROMORPHONE HCL 110MG/55ML
1 PATIENT CONTROLLED ANALGESIA SYRINGE INTRAVENOUS
Status: DISCONTINUED | OUTPATIENT
Start: 2021-09-13 | End: 2021-09-13 | Stop reason: HOSPADM

## 2021-09-13 RX ORDER — OXYCODONE HYDROCHLORIDE 5 MG/1
7.5 TABLET ORAL ONCE AS NEEDED
Status: DISCONTINUED | OUTPATIENT
Start: 2021-09-13 | End: 2021-09-13 | Stop reason: HOSPADM

## 2021-09-13 RX ORDER — FENTANYL CITRATE 50 UG/ML
25 INJECTION, SOLUTION INTRAMUSCULAR; INTRAVENOUS
Status: DISCONTINUED | OUTPATIENT
Start: 2021-09-13 | End: 2021-09-13 | Stop reason: HOSPADM

## 2021-09-13 RX ORDER — ACETAMINOPHEN 650 MG/1
650 SUPPOSITORY RECTAL ONCE AS NEEDED
Status: DISCONTINUED | OUTPATIENT
Start: 2021-09-13 | End: 2021-09-13 | Stop reason: HOSPADM

## 2021-09-13 RX ORDER — ALBUTEROL SULFATE 2.5 MG/3ML
2.5 SOLUTION RESPIRATORY (INHALATION) ONCE AS NEEDED
Status: DISCONTINUED | OUTPATIENT
Start: 2021-09-13 | End: 2021-09-13 | Stop reason: HOSPADM

## 2021-09-13 RX ORDER — ONDANSETRON 2 MG/ML
4 INJECTION INTRAMUSCULAR; INTRAVENOUS ONCE AS NEEDED
Status: DISCONTINUED | OUTPATIENT
Start: 2021-09-13 | End: 2021-09-13 | Stop reason: HOSPADM

## 2021-09-13 RX ORDER — SODIUM CHLORIDE, SODIUM LACTATE, POTASSIUM CHLORIDE, AND CALCIUM CHLORIDE .6; .31; .03; .02 G/100ML; G/100ML; G/100ML; G/100ML
20 INJECTION, SOLUTION INTRAVENOUS CONTINUOUS
Status: DISCONTINUED | OUTPATIENT
Start: 2021-09-13 | End: 2021-09-13 | Stop reason: HOSPADM

## 2021-09-13 RX ORDER — FENTANYL CITRATE 50 UG/ML
INJECTION, SOLUTION INTRAMUSCULAR; INTRAVENOUS AS NEEDED
Status: DISCONTINUED | OUTPATIENT
Start: 2021-09-13 | End: 2021-09-13 | Stop reason: SURG

## 2021-09-13 RX ORDER — LIDOCAINE HYDROCHLORIDE 20 MG/ML
INJECTION, SOLUTION EPIDURAL; INFILTRATION; INTRACAUDAL; PERINEURAL AS NEEDED
Status: DISCONTINUED | OUTPATIENT
Start: 2021-09-13 | End: 2021-09-13 | Stop reason: SURG

## 2021-09-13 RX ORDER — LABETALOL HYDROCHLORIDE 5 MG/ML
5 INJECTION, SOLUTION INTRAVENOUS
Status: DISCONTINUED | OUTPATIENT
Start: 2021-09-13 | End: 2021-09-13 | Stop reason: HOSPADM

## 2021-09-13 RX ORDER — HYDROCODONE BITARTRATE AND ACETAMINOPHEN 5; 325 MG/1; MG/1
1 TABLET ORAL EVERY 4 HOURS PRN
Qty: 10 TABLET | Refills: 0 | OUTPATIENT
Start: 2021-09-13 | End: 2021-09-19

## 2021-09-13 RX ORDER — KETOROLAC TROMETHAMINE 15 MG/ML
INJECTION, SOLUTION INTRAMUSCULAR; INTRAVENOUS AS NEEDED
Status: DISCONTINUED | OUTPATIENT
Start: 2021-09-13 | End: 2021-09-13 | Stop reason: SURG

## 2021-09-13 RX ORDER — OXYCODONE HYDROCHLORIDE 5 MG/1
10 TABLET ORAL EVERY 4 HOURS PRN
Status: DISCONTINUED | OUTPATIENT
Start: 2021-09-13 | End: 2021-09-13 | Stop reason: HOSPADM

## 2021-09-13 RX ORDER — PROPOFOL 10 MG/ML
VIAL (ML) INTRAVENOUS AS NEEDED
Status: DISCONTINUED | OUTPATIENT
Start: 2021-09-13 | End: 2021-09-13 | Stop reason: SURG

## 2021-09-13 RX ADMIN — FENTANYL CITRATE 50 MCG: 50 INJECTION, SOLUTION INTRAMUSCULAR; INTRAVENOUS at 09:33

## 2021-09-13 RX ADMIN — DEXAMETHASONE SODIUM PHOSPHATE 4 MG: 4 INJECTION, SOLUTION INTRAMUSCULAR; INTRAVENOUS at 08:57

## 2021-09-13 RX ADMIN — FENTANYL CITRATE 50 MCG: 0.05 INJECTION, SOLUTION INTRAMUSCULAR; INTRAVENOUS at 10:45

## 2021-09-13 RX ADMIN — FENTANYL CITRATE 50 MCG: 50 INJECTION, SOLUTION INTRAMUSCULAR; INTRAVENOUS at 09:20

## 2021-09-13 RX ADMIN — CEFAZOLIN SODIUM 2 G: 1 INJECTION, POWDER, FOR SOLUTION INTRAMUSCULAR; INTRAVENOUS at 08:57

## 2021-09-13 RX ADMIN — SODIUM CHLORIDE, POTASSIUM CHLORIDE, SODIUM LACTATE AND CALCIUM CHLORIDE 20 ML/HR: 600; 310; 30; 20 INJECTION, SOLUTION INTRAVENOUS at 07:34

## 2021-09-13 RX ADMIN — OXYCODONE 10 MG: 5 TABLET ORAL at 10:54

## 2021-09-13 RX ADMIN — LIDOCAINE HYDROCHLORIDE 100 MG: 20 INJECTION, SOLUTION EPIDURAL; INFILTRATION; INTRACAUDAL; PERINEURAL at 08:53

## 2021-09-13 RX ADMIN — KETOROLAC TROMETHAMINE 15 MG: 15 INJECTION, SOLUTION INTRAMUSCULAR; INTRAVENOUS at 09:34

## 2021-09-13 RX ADMIN — SODIUM CHLORIDE, POTASSIUM CHLORIDE, SODIUM LACTATE AND CALCIUM CHLORIDE 100 ML: 600; 310; 30; 20 INJECTION, SOLUTION INTRAVENOUS at 08:49

## 2021-09-13 RX ADMIN — ROCURONIUM BROMIDE 50 MG: 10 INJECTION INTRAVENOUS at 08:53

## 2021-09-13 RX ADMIN — PROPOFOL 200 MG: 10 INJECTION, EMULSION INTRAVENOUS at 08:53

## 2021-09-13 RX ADMIN — SUGAMMADEX 200 MG: 100 INJECTION, SOLUTION INTRAVENOUS at 09:45

## 2021-09-13 RX ADMIN — FENTANYL CITRATE 100 MCG: 50 INJECTION, SOLUTION INTRAMUSCULAR; INTRAVENOUS at 08:50

## 2021-09-13 RX ADMIN — MIDAZOLAM 2 MG: 1 INJECTION INTRAMUSCULAR; INTRAVENOUS at 08:49

## 2021-09-13 RX ADMIN — ONDANSETRON 4 MG: 2 INJECTION INTRAMUSCULAR; INTRAVENOUS at 09:33

## 2021-09-13 NOTE — OP NOTE
Operative Report:    Patient Name:  Meeta Garcia  YOB: 1995    Date of Surgery:  9/13/2021     Indications: 26-year-old lady who I met in the office to discuss her upper abdominal symptoms.  She was found to have cholelithiasis and I counseled her about the risk and benefits of cholecystectomy for symptomatic cholelithiasis.  She understood the risks and was willing to proceed.    Pre-op Diagnosis:   Symptomatic cholelithiasis [K80.20]       Post-Op Diagnosis Codes:     * Symptomatic cholelithiasis [K80.20]    Procedure/CPT® Codes:      Procedure(s):  CHOLECYSTECTOMY LAPAROSCOPIC    Staff:  Surgeon(s):  Clayton Gibson MD    Circulator: Jaclyn Crisostomo RN  Scrub Person: Lorenza Foy  Assistant: Trudy Deustch APRN  was responsible for performing the following activities: Retraction, Closing and Held/Positioned Camera and their skilled assistance was necessary for the success of this case.        Anesthesia: General    Estimated Blood Loss: 15 mL    Implants:    Implant Name Type Inv. Item Serial No.  Lot No. LRB No. Used Action   CLIPAPPLR M/ ENDO LIGAMAX5 5MM 33CM MD/LG - [id number] Implant CLIPAPPLR M/ ENDO LIGAMAX5 5MM 33CM MD/LG  [employer]  DIV OF J AND J [id number] N/A 1 Implanted   SEAL HEMO ABS REX AH PWDR 3GM - [id number] Implant SEAL HEMO ABS REX AH PWDR 3GM  [employer] HEMOSTAT[employer] POLYMER Single Touch Systems [id number] N/A 1 Implanted       Specimen:          Specimens     ID Source Type Tests Collected By Collected At Frozen?    A Gallbladder Tissue · TISSUE PATHOLOGY EXAM   Clayton Gibson MD 9/13/21 0918 No    Description: Gallbladder with contents    This specimen was not marked as sent.              Findings: Gallbladder containing gallstones    Complications: None    Description of Procedure: Patient was taken to the operating placed on the operative table in the supine position under general anesthesia.  Her abdomen was prepped draped normal sterile fashion.   Timeout was performed identifying correct patient procedure site of operation.  I began the operation by entering the abdomen through an infraumbilical skin incision the fascia was grasped elevated was incised the peritoneum was entered bluntly.  The Yee trocar was placed abdomen insufflated camera to reduce there is no evidence of injury to underlying structures.  She was placed in reverse Trendelenburg with right side up.  An epigastric and 2 right subcostal 5 mm ports placed under laparoscopic guidance.  Gallbladder is grasped retractor was right upper quadrant.  The infundibulum tracked laterally.  Dissection the base the gallbladder was performed with the hook Bovie and the [place] dissector.  The cystic duct and cystic artery were identified dissected free from surrounding structures the base the gallbladder was dissected free off the liver edge revealing the critical view.  The cystic duct and artery were doubly clipped and divided the gallbladder was dissected off of the liver edge using the Bovie.  During the retraction process there was a capsular tear at the mid to distal body of the gallbladder which was cauterized.  There was minimal bleeding.  The gallbladder once it was free off the liver edge was placed in Endo Catch bag and removed.  It contained a large gallstone.  The right upper quadrant was then inspected hemostasis had been achieved the clips were in good position without any evidence of bleeding or bile leak.  I did place some Marlon at the dissection planes to the capsular tear to help prevent any bleeding after desufflation.  The ports were serially removed without any abdominal wall hemorrhage.  The Yee trocar site was closed with 0 Vicryl suture the skin with 4-0 Vicryl suture and skin affix was placed on the incisions.  She tolerated procedure well transferred to recovery in satisfactory condition.      Clayton Gibson MD     Date: 9/13/2021  Time: 09:47 EDT    This note was  created using Dragon Voice Recognition software.

## 2021-09-13 NOTE — ANESTHESIA PROCEDURE NOTES
Airway  Urgency: elective    Date/Time: 9/13/2021 8:55 AM  Airway not difficult    General Information and Staff    Patient location during procedure: OR  Anesthesiologist: Silverio Rivera MD  CRNA: Giselle Coombs AA    Indications and Patient Condition  Indications for airway management: airway protection    Preoxygenated: yes  Mask difficulty assessment: 1 - vent by mask    Final Airway Details  Final airway type: endotracheal airway      Successful airway: ETT  Cuffed: yes   Successful intubation technique: direct laryngoscopy  Facilitating devices/methods: intubating stylet  Endotracheal tube insertion site: oral  Blade: Lucas  Blade size: 3  ETT size (mm): 7.0  Cormack-Lehane Classification: grade I - full view of glottis  Placement verified by: capnometry   Measured from: teeth  Number of attempts at approach: 1  Assessment: lips, teeth, and gum same as pre-op and atraumatic intubation    Additional Comments  Intubation performed by CORDELL Cedeno

## 2021-09-13 NOTE — ANESTHESIA POSTPROCEDURE EVALUATION
Patient: Meeta Garcia    Procedure Summary     Date: 09/13/21 Room / Location: [facility] OR 06 / [facility] MAIN OR    Anesthesia Start: 0849 Anesthesia Stop: 0956    Procedure: CHOLECYSTECTOMY LAPAROSCOPIC (N/A Abdomen) Diagnosis:       Symptomatic cholelithiasis      (Symptomatic cholelithiasis [K80.20])    Surgeons: Clayton Gibson MD Provider: Silverio Rivera MD    Anesthesia Type: general ASA Status: 2          Anesthesia Type: general    Vitals  Vitals Value Taken Time   /88 09/13/21 1101   Temp 98.6 °F (37 °C) 09/13/21 1101   Pulse 86 09/13/21 1102   Resp 16 09/13/21 1101   SpO2 94 % 09/13/21 1102   Vitals shown include unvalidated device data.        Post Anesthesia Care and Evaluation    Patient location during evaluation: bedside  Patient participation: complete - patient participated  Level of consciousness: awake and alert  Pain score: 1  Pain management: adequate  Airway patency: patent  Anesthetic complications: No anesthetic complications  PONV Status: none  Cardiovascular status: acceptable  Respiratory status: acceptable  Hydration status: acceptable  Post Neuraxial Block status: Motor and sensory function returned to baseline

## 2021-09-13 NOTE — ANESTHESIA PREPROCEDURE EVALUATION
Anesthesia Evaluation     Patient summary reviewed and Nursing notes reviewed   NPO Solid Status: > 6 hours  NPO Liquid Status: > 6 hours           Airway   Mallampati: II  TM distance: >3 FB  Neck ROM: full  No difficulty expected  Dental - normal exam     Pulmonary - negative pulmonary ROS and normal exam    breath sounds clear to auscultation  Cardiovascular - normal exam    ECG reviewed  Rhythm: regular  Rate: normal    (+) hypertension,       Neuro/Psych  (+) headaches,     GI/Hepatic/Renal/Endo    (+) obesity,       Musculoskeletal (-) negative ROS    Abdominal  - normal exam    Abdomen: soft.  Bowel sounds: normal.   Substance History - negative use     OB/GYN negative ob/gyn ROS         Other - negative ROS                       Anesthesia Plan    ASA 2     general     intravenous induction     Anesthetic plan, all risks, benefits, and alternatives have been provided, discussed and informed consent has been obtained with: patient.  Use of blood products discussed with patient .   Plan discussed with CAA.

## 2021-09-14 ENCOUNTER — TELEPHONE (OUTPATIENT)
Dept: SURGERY | Facility: CLINIC | Age: 26
End: 2021-09-14

## 2021-09-14 LAB
LAB AP CASE REPORT: NORMAL
PATH REPORT.FINAL DX SPEC: NORMAL
PATH REPORT.GROSS SPEC: NORMAL

## 2021-09-16 ENCOUNTER — TELEPHONE (OUTPATIENT)
Dept: SURGERY | Facility: CLINIC | Age: 26
End: 2021-09-16

## 2021-09-16 NOTE — TELEPHONE ENCOUNTER
Pt called and left a VM on my phone that she needed a number to fax   FMLA papers to , I called her back but VM said it was not set up.  martha

## 2021-09-19 ENCOUNTER — APPOINTMENT (OUTPATIENT)
Dept: CT IMAGING | Facility: HOSPITAL | Age: 26
End: 2021-09-19

## 2021-09-19 ENCOUNTER — HOSPITAL ENCOUNTER (EMERGENCY)
Facility: HOSPITAL | Age: 26
Discharge: HOME | End: 2021-09-19
Attending: EMERGENCY MEDICINE | Admitting: EMERGENCY MEDICINE

## 2021-09-19 VITALS
HEIGHT: 68 IN | OXYGEN SATURATION: 99 % | HEART RATE: 93 BPM | BODY MASS INDEX: 33.61 KG/M2 | RESPIRATION RATE: 16 BRPM | WEIGHT: 221.78 LBS | DIASTOLIC BLOOD PRESSURE: 73 MMHG | SYSTOLIC BLOOD PRESSURE: 128 MMHG | TEMPERATURE: 98 F

## 2021-09-19 DIAGNOSIS — K52.9 GASTROENTERITIS: ICD-10-CM

## 2021-09-19 DIAGNOSIS — R10.9 ACUTE ABDOMINAL PAIN: Primary | ICD-10-CM

## 2021-09-19 DIAGNOSIS — Z20.822 LAB TEST NEGATIVE FOR COVID-19 VIRUS: ICD-10-CM

## 2021-09-19 LAB
ALBUMIN SERPL-MCNC: 4.1 G/DL (ref 3.5–5.2)
ALBUMIN/GLOB SERPL: 1 G/DL
ALP SERPL-CCNC: 102 U/L (ref 39–117)
ALT SERPL W P-5'-P-CCNC: 16 U/L (ref 1–33)
AMYLASE SERPL-CCNC: 30 U/L (ref 28–100)
ANION GAP SERPL CALCULATED.3IONS-SCNC: 16 MMOL/L (ref 5–15)
AST SERPL-CCNC: 14 U/L (ref 1–32)
BACTERIA UR QL AUTO: ABNORMAL /HPF
BASOPHILS # BLD AUTO: 0.1 10*3/MM3 (ref 0–0.2)
BASOPHILS NFR BLD AUTO: 0.3 % (ref 0–1.5)
BILIRUB SERPL-MCNC: 0.8 MG/DL (ref 0–1.2)
BILIRUB UR QL STRIP: ABNORMAL
BUN SERPL-MCNC: 8 MG/DL (ref 6–20)
BUN/CREAT SERPL: 12.7 (ref 7–25)
CALCIUM SPEC-SCNC: 9.4 MG/DL (ref 8.6–10.5)
CHLORIDE SERPL-SCNC: 100 MMOL/L (ref 98–107)
CLARITY UR: ABNORMAL
CO2 SERPL-SCNC: 19 MMOL/L (ref 22–29)
COLOR UR: ABNORMAL
CREAT SERPL-MCNC: 0.63 MG/DL (ref 0.57–1)
D-LACTATE SERPL-SCNC: 0.9 MMOL/L (ref 0.5–2)
DEPRECATED RDW RBC AUTO: 41.6 FL (ref 37–54)
EOSINOPHIL # BLD AUTO: 0 10*3/MM3 (ref 0–0.4)
EOSINOPHIL NFR BLD AUTO: 0.1 % (ref 0.3–6.2)
ERYTHROCYTE [DISTWIDTH] IN BLOOD BY AUTOMATED COUNT: 14.3 % (ref 12.3–15.4)
GFR SERPL CREATININE-BSD FRML MDRD: 114 ML/MIN/1.73
GLOBULIN UR ELPH-MCNC: 4 GM/DL
GLUCOSE SERPL-MCNC: 120 MG/DL (ref 65–99)
GLUCOSE UR STRIP-MCNC: NEGATIVE MG/DL
HCG SERPL QL: NEGATIVE
HCT VFR BLD AUTO: 42.4 % (ref 34–46.6)
HGB BLD-MCNC: 14.6 G/DL (ref 12–15.9)
HGB UR QL STRIP.AUTO: ABNORMAL
HYALINE CASTS UR QL AUTO: ABNORMAL /LPF
KETONES UR QL STRIP: ABNORMAL
LEUKOCYTE ESTERASE UR QL STRIP.AUTO: ABNORMAL
LIPASE SERPL-CCNC: 19 U/L (ref 13–60)
LYMPHOCYTES # BLD AUTO: 1.3 10*3/MM3 (ref 0.7–3.1)
LYMPHOCYTES NFR BLD AUTO: 7.2 % (ref 19.6–45.3)
MCH RBC QN AUTO: 28.5 PG (ref 26.6–33)
MCHC RBC AUTO-ENTMCNC: 34.4 G/DL (ref 31.5–35.7)
MCV RBC AUTO: 82.7 FL (ref 79–97)
MONOCYTES # BLD AUTO: 0.4 10*3/MM3 (ref 0.1–0.9)
MONOCYTES NFR BLD AUTO: 2.2 % (ref 5–12)
NEUTROPHILS NFR BLD AUTO: 16.6 10*3/MM3 (ref 1.7–7)
NEUTROPHILS NFR BLD AUTO: 90.2 % (ref 42.7–76)
NITRITE UR QL STRIP: NEGATIVE
NRBC BLD AUTO-RTO: 0.1 /100 WBC (ref 0–0.2)
PH UR STRIP.AUTO: 5.5 [PH] (ref 5–8)
PLATELET # BLD AUTO: 485 10*3/MM3 (ref 140–450)
PMV BLD AUTO: 9.6 FL (ref 6–12)
POTASSIUM SERPL-SCNC: 4.2 MMOL/L (ref 3.5–5.2)
PROT SERPL-MCNC: 8.1 G/DL (ref 6–8.5)
PROT UR QL STRIP: ABNORMAL
RBC # BLD AUTO: 5.12 10*6/MM3 (ref 3.77–5.28)
RBC # UR: ABNORMAL /HPF
REF LAB TEST METHOD: ABNORMAL
SARS-COV-2 RNA PNL SPEC NAA+PROBE: NOT DETECTED
SODIUM SERPL-SCNC: 135 MMOL/L (ref 136–145)
SP GR UR STRIP: >=1.03 (ref 1–1.03)
SQUAMOUS #/AREA URNS HPF: ABNORMAL /HPF
UROBILINOGEN UR QL STRIP: ABNORMAL
WBC # BLD AUTO: 18.5 10*3/MM3 (ref 3.4–10.8)
WBC UR QL AUTO: ABNORMAL /HPF
WHOLE BLOOD HOLD SPECIMEN: NORMAL

## 2021-09-19 PROCEDURE — 25010000002 MORPHINE PER 10 MG: Performed by: EMERGENCY MEDICINE

## 2021-09-19 PROCEDURE — 82150 ASSAY OF AMYLASE: CPT | Performed by: EMERGENCY MEDICINE

## 2021-09-19 PROCEDURE — 96375 TX/PRO/DX INJ NEW DRUG ADDON: CPT

## 2021-09-19 PROCEDURE — 83690 ASSAY OF LIPASE: CPT | Performed by: EMERGENCY MEDICINE

## 2021-09-19 PROCEDURE — 83605 ASSAY OF LACTIC ACID: CPT

## 2021-09-19 PROCEDURE — 96374 THER/PROPH/DIAG INJ IV PUSH: CPT

## 2021-09-19 PROCEDURE — 80053 COMPREHEN METABOLIC PANEL: CPT | Performed by: EMERGENCY MEDICINE

## 2021-09-19 PROCEDURE — 25010000002 ONDANSETRON PER 1 MG: Performed by: EMERGENCY MEDICINE

## 2021-09-19 PROCEDURE — 74177 CT ABD & PELVIS W/CONTRAST: CPT

## 2021-09-19 PROCEDURE — 84703 CHORIONIC GONADOTROPIN ASSAY: CPT | Performed by: EMERGENCY MEDICINE

## 2021-09-19 PROCEDURE — 87040 BLOOD CULTURE FOR BACTERIA: CPT | Performed by: EMERGENCY MEDICINE

## 2021-09-19 PROCEDURE — 0 IOPAMIDOL PER 1 ML: Performed by: EMERGENCY MEDICINE

## 2021-09-19 PROCEDURE — 85025 COMPLETE CBC W/AUTO DIFF WBC: CPT | Performed by: EMERGENCY MEDICINE

## 2021-09-19 PROCEDURE — 87086 URINE CULTURE/COLONY COUNT: CPT | Performed by: EMERGENCY MEDICINE

## 2021-09-19 PROCEDURE — 99283 EMERGENCY DEPT VISIT LOW MDM: CPT

## 2021-09-19 PROCEDURE — 87635 SARS-COV-2 COVID-19 AMP PRB: CPT | Performed by: EMERGENCY MEDICINE

## 2021-09-19 PROCEDURE — 81001 URINALYSIS AUTO W/SCOPE: CPT | Performed by: EMERGENCY MEDICINE

## 2021-09-19 RX ORDER — ONDANSETRON 4 MG/1
4 TABLET, ORALLY DISINTEGRATING ORAL EVERY 8 HOURS PRN
Qty: 12 TABLET | Refills: 0 | Status: SHIPPED | OUTPATIENT
Start: 2021-09-19 | End: 2021-10-19

## 2021-09-19 RX ORDER — MORPHINE SULFATE 4 MG/ML
2 INJECTION, SOLUTION INTRAMUSCULAR; INTRAVENOUS ONCE
Status: COMPLETED | OUTPATIENT
Start: 2021-09-19 | End: 2021-09-19

## 2021-09-19 RX ORDER — ONDANSETRON 2 MG/ML
4 INJECTION INTRAMUSCULAR; INTRAVENOUS ONCE
Status: COMPLETED | OUTPATIENT
Start: 2021-09-19 | End: 2021-09-19

## 2021-09-19 RX ORDER — SODIUM CHLORIDE 0.9 % (FLUSH) 0.9 %
10 SYRINGE (ML) INJECTION AS NEEDED
Status: DISCONTINUED | OUTPATIENT
Start: 2021-09-19 | End: 2021-09-19 | Stop reason: HOSPADM

## 2021-09-19 RX ORDER — HYDROCODONE BITARTRATE AND ACETAMINOPHEN 5; 325 MG/1; MG/1
1 TABLET ORAL EVERY 4 HOURS PRN
Qty: 20 TABLET | Refills: 0 | Status: SHIPPED | OUTPATIENT
Start: 2021-09-19 | End: 2021-10-19

## 2021-09-19 RX ADMIN — ONDANSETRON 4 MG: 2 INJECTION INTRAMUSCULAR; INTRAVENOUS at 03:02

## 2021-09-19 RX ADMIN — IOPAMIDOL 100 ML: 755 INJECTION, SOLUTION INTRAVENOUS at 06:40

## 2021-09-19 RX ADMIN — FAMOTIDINE 20 MG: 10 INJECTION INTRAVENOUS at 03:02

## 2021-09-19 RX ADMIN — MORPHINE SULFATE 2 MG: 4 INJECTION INTRAVENOUS at 03:02

## 2021-09-19 RX ADMIN — SODIUM CHLORIDE 1000 ML: 9 INJECTION, SOLUTION INTRAVENOUS at 03:02

## 2021-09-19 NOTE — ED PROVIDER NOTES
Subjective   26-year-old female who had laparoscopic cholecystectomy done by Dr. Gibson on Monday.  Patient states she did well postoperatively until 2 PM today at which time she developed severe nausea and vomiting, nonbloody, associated with some diarrhea and moderate diffuse abdominal pain.  No cough or fever or chest pain or shortness of air or urinary symptoms.          Review of Systems   Gastrointestinal: Positive for abdominal pain, diarrhea, nausea and vomiting.   All other systems reviewed and are negative.      Past Medical History:   Diagnosis Date   • Hypertension    • PCOS (polycystic ovarian syndrome)        No Known Allergies    Past Surgical History:   Procedure Laterality Date   • CHOLECYSTECTOMY N/A 9/13/2021    Procedure: CHOLECYSTECTOMY LAPAROSCOPIC;  Surgeon: Clayton Gibson MD;  Location: [facility] OR;  Service: General;  Laterality: N/A;   • WISDOM TOOTH EXTRACTION  2011       Family History   Problem Relation Age of Onset   • Hypertension Mother    • Diabetes Father        Social History     Socioeconomic History   • Marital status: Significant Other     Spouse name: Not on file   • Number of children: Not on file   • Years of education: Not on file   • Highest education level: Not on file   Tobacco Use   • Smoking status: Never Smoker   • Smokeless tobacco: Never Used   Vaping Use   • Vaping Use: Never used   Substance and Sexual Activity   • Alcohol use: Not Currently   • Drug use: Never   • Sexual activity: Yes     Partners: Female     Birth control/protection: Condom           Objective   Physical Exam  Constitutional:       Appearance: She is obese.   HENT:      Head: Normocephalic and atraumatic.      Mouth/Throat:      Mouth: Mucous membranes are moist.      Pharynx: Oropharynx is clear.   Eyes:      Conjunctiva/sclera: Conjunctivae normal.      Pupils: Pupils are equal, round, and reactive to light.   Cardiovascular:      Rate and Rhythm: Tachycardia present.      Heart sounds:  Normal heart sounds.   Pulmonary:      Effort: Pulmonary effort is normal.      Breath sounds: Normal breath sounds.   Abdominal:      Comments: Incisions over clean dry and intact, obese abdomen, moderate diffuse tenderness, no rebound or guarding, normal bowel sounds   Musculoskeletal:         General: No swelling or tenderness. Normal range of motion.   Skin:     General: Skin is warm and dry.      Capillary Refill: Capillary refill takes less than 2 seconds.   Neurological:      General: No focal deficit present.      Mental Status: She is alert and oriented to person, place, and time.   Psychiatric:         Mood and Affect: Mood normal.         Behavior: Behavior normal.         Procedures           ED Course                                           MDM  Number of Diagnoses or Management Options  Acute abdominal pain  Gastroenteritis  Lab test negative for COVID-19 virus  Diagnosis management comments: Results for orders placed or performed during the hospital encounter of 09/19/21  -COVID-19,CEPHEID/KALYANI/BDMAX,COR/DAINA/PAD/MARY IN-HOUSE(OR EMERGENT/ADD-ON),NP SWAB IN TRANSPORT MEDIA 3-4 HR TAT, RT-PCR - Swab, Nasopharynx  Specimen: Nasopharynx; Swab       Result                      Value             Ref Range           COVID19                     Not Detected      Not Detected*  -Comprehensive Metabolic Panel  Specimen: Blood       Result                      Value             Ref Range           Glucose                     120 (H)           65 - 99 mg/dL       BUN                         8                 6 - 20 mg/dL        Creatinine                  0.63              0.57 - 1.00 *       Sodium                      135 (L)           136 - 145 mm*       Potassium                   4.2               3.5 - 5.2 mm*       Chloride                    100               98 - 107 mmo*       CO2                         19.0 (L)          22.0 - 29.0 *       Calcium                     9.4               8.6 - 10.5 m*        Total Protein               8.1               6.0 - 8.5 g/*       Albumin                     4.10              3.50 - 5.20 *       ALT (SGPT)                  16                1 - 33 U/L          AST (SGOT)                  14                1 - 32 U/L          Alkaline Phosphatase        102               39 - 117 U/L        Total Bilirubin             0.8               0.0 - 1.2 mg*       eGFR Non  Amer       114               >60 mL/min/1*       Globulin                    4.0               gm/dL               A/G Ratio                   1.0               g/dL                BUN/Creatinine Ratio        12.7              7.0 - 25.0          Anion Gap                   16.0 (H)          5.0 - 15.0 m*  -Amylase  Specimen: Blood       Result                      Value             Ref Range           Amylase                     30                28 - 100 U/L   -Lipase  Specimen: Blood       Result                      Value             Ref Range           Lipase                      19                13 - 60 U/L    -hCG, Serum, Qualitative  Specimen: Blood       Result                      Value             Ref Range           HCG Qualitative             Negative          Negative       -Urinalysis With Culture If Indicated - Urine, Clean Catch  Specimen: Urine, Clean Catch       Result                      Value             Ref Range           Color, UA                                     Yellow, Straw   Dark Yellow (A)       Appearance, UA              Cloudy (A)        Clear               pH, UA                      5.5               5.0 - 8.0           Specific Gravity, UA        >=1.030           1.005 - 1.030       Glucose, UA                 Negative          Negative            Ketones, UA                                   Negative        15 mg/dL (1+) (A)       Bilirubin, UA                                 Negative        Small (1+) (A)       Blood, UA                   Trace (A)         Negative             Protein, UA                                   Negative        100 mg/dL (2+) (A)       Leuk Esterase, UA           Trace (A)         Negative            Nitrite, UA                 Negative          Negative            Urobilinogen, UA            0.2 E.U./dL       0.2 - 1.0 E.*  -CBC Auto Differential  Specimen: Blood       Result                      Value             Ref Range           WBC                         18.50 (H)         3.40 - 10.80*       RBC                         5.12              3.77 - 5.28 *       Hemoglobin                  14.6              12.0 - 15.9 *       Hematocrit                  42.4              34.0 - 46.6 %       MCV                         82.7              79.0 - 97.0 *       MCH                         28.5              26.6 - 33.0 *       MCHC                        34.4              31.5 - 35.7 *       RDW                         14.3              12.3 - 15.4 %       RDW-SD                      41.6              37.0 - 54.0 *       MPV                         9.6               6.0 - 12.0 fL       Platelets                   485 (H)           140 - 450 10*       Neutrophil %                90.2 (H)          42.7 - 76.0 %       Lymphocyte %                7.2 (L)           19.6 - 45.3 %       Monocyte %                  2.2 (L)           5.0 - 12.0 %        Eosinophil %                0.1 (L)           0.3 - 6.2 %         Basophil %                  0.3               0.0 - 1.5 %         Neutrophils, Absolute       16.60 (H)         1.70 - 7.00 *       Lymphocytes, Absolute       1.30              0.70 - 3.10 *       Monocytes, Absolute         0.40              0.10 - 0.90 *       Eosinophils, Absolute       0.00              0.00 - 0.40 *       Basophils, Absolute         0.10              0.00 - 0.20 *       nRBC                        0.1               0.0 - 0.2 /1*  -Urinalysis, Microscopic Only - Urine, Clean Catch  Specimen: Urine, Clean Catch       Result                       Value             Ref Range           RBC, UA                     0-2 (A)           None Seen /H*       WBC, UA                     6-12 (A)          None Seen /H*       Bacteria, UA                3+ (A)            None Seen /H*       Squamous Epithelial Ce*     7-12 (A)          None Seen, 0*       Hyaline Casts, UA           3-6               None Seen /L*       Methodology                                                   Manual Light Microscopy  -POC Lactate  Specimen: Blood       Result                      Value             Ref Range           Lactate                     0.9               0.5 - 2.0 mm*  -Light Blue Top       Result                      Value             Ref Range           Extra Tube                                                    hold for add-on  CT Abdomen Pelvis With Contrast   Final Result        1.  Borderline dilated loops of small bowel in the right mid to lower abdomen leading to the terminal ileum with moderate associated circumferential wall thickening. Ascending colon is also partially fluid-filled. Findings are suspicious for enteritis     which can be of of infectious, inflammatory, or ischemic etiology.    2.  Small to moderate volume free fluid in the pelvis.    3.  Status post cholecystectomy with small amount of ill-defined fluid in the gallbladder fossa. No discrete collection in region. May be within expected limits given recent surgery.    4.  No free air.    5.  Well-circumscribed fluid density lesion at the inner surface of anterior peritoneal wall in right upper abdomen. Uncertain clinical significance. Could reflect mesenteric cyst or could be related to postoperative seroma/collection. Does not appear to     be inflammatory as there are no surrounding inflammatory changes.    6.  Postoperative changes in anterior abdominal wall are probably within expected limits for recent surgery.    7.  Other findings as above.        Electronically signed by:   Alvin Olsen D.O.      9/19/2021 5:24 AM       Patient well, no clinical correlation UTI, vital signs sniffily improved, tachycardia resolved, pain and nausea controlled.  Favor abdominal soreness which did onset after the nausea and vomiting.  No significant postoperative findings to connect her symptoms to her recent surgery.  Patient does have good follow-up with her surgeon.  Recommended she notify her surgeon of today's imaging done today for his review.  Inspect reviewed.       Amount and/or Complexity of Data Reviewed  Clinical lab tests: ordered and reviewed  Tests in the radiology section of CPT®: ordered and reviewed  Tests in the medicine section of CPT®: reviewed and ordered  Decide to obtain previous medical records or to obtain history from someone other than the patient: yes        Final diagnoses:   Acute abdominal pain   Gastroenteritis   Lab test negative for COVID-19 virus       ED Disposition  ED Disposition     ED Disposition Condition Comment    Discharge Stable           Colleen Mcelroy, APRN  [address]  [place] [id number] [phone]    Schedule an appointment as soon as possible for a visit            Medication List      Changed    lisinopril-hydrochlorothiazide 20-12.5 MG per tablet  Commonly known as: PRINZIDE,ZESTORETIC  Take 1 tablet by mouth Daily.  What changed: additional instructions     * ondansetron ODT 4 MG disintegrating tablet  Commonly known as: Zofran ODT  Place 1 tablet on the tongue Every 6 (Six) Hours As Needed for Nausea or Vomiting.  What changed: Another medication with the same name was added. Make sure you understand how and when to take each.     * ondansetron ODT 4 MG disintegrating tablet  Commonly known as: ZOFRAN-ODT  Place 1 tablet on the tongue Every 8 (Eight) Hours As Needed for Nausea.  What changed: You were already taking a medication with the same name, and this prescription was added. Make sure you understand how and when to take each.          * This list has 2 medication(s) that are the same as other medications prescribed for you. Read the directions carefully, and ask your doctor or other care provider to review them with you.               Where to Get Your Medications      These medications were sent to [facility] [address] [place] [address], [place] - [address] [facility] - [phone]  - [phone]   [address] [facility] [place] [address]    Phone: [phone]   · HYDROcodone-acetaminophen 5-325 MG per tablet  · ondansetron ODT 4 MG disintegrating tablet          Ac Juarez MD  09/19/21 [id number]

## 2021-09-20 LAB — BACTERIA SPEC AEROBE CULT: NORMAL

## 2021-09-24 LAB
BACTERIA SPEC AEROBE CULT: NORMAL
BACTERIA SPEC AEROBE CULT: NORMAL

## 2021-09-28 ENCOUNTER — OFFICE VISIT (OUTPATIENT)
Dept: SURGERY | Facility: CLINIC | Age: 26
End: 2021-09-28

## 2021-09-28 VITALS
SYSTOLIC BLOOD PRESSURE: 112 MMHG | HEIGHT: 68 IN | TEMPERATURE: 98.4 F | WEIGHT: 225 LBS | OXYGEN SATURATION: 100 % | HEART RATE: 100 BPM | DIASTOLIC BLOOD PRESSURE: 82 MMHG | BODY MASS INDEX: 34.1 KG/M2

## 2021-09-28 DIAGNOSIS — K80.20 SYMPTOMATIC CHOLELITHIASIS: Primary | ICD-10-CM

## 2021-09-28 PROCEDURE — 99024 POSTOP FOLLOW-UP VISIT: CPT | Performed by: SURGERY

## 2021-09-28 NOTE — PROGRESS NOTES
SUBJECTIVE:    Meeta is seen in the office this morning at Dr. Gibson's absence.  She is 2 weeks post lap bogdan.  She now is doing well.  She did have some nausea and vomiting of greenish fluid on the weekend after her surgery.  Has since gone away.  Her main complaint is constipation.    OBJECTIVE:    Incisions are healing appropriately without infection    ASSESSMENT:    Satisfactory postop progress    PLAN:    We have discussed different laxatives that she can take including Dulcolax and MiraLAX.  I have explained if she continues have issues with constipation once she is completely off the pain meds to let us know and we would give further recommendations.  Otherwise, recheck in the office as needed

## 2021-10-19 ENCOUNTER — OFFICE VISIT (OUTPATIENT)
Dept: FAMILY MEDICINE CLINIC | Facility: CLINIC | Age: 26
End: 2021-10-19

## 2021-10-19 VITALS
OXYGEN SATURATION: 100 % | SYSTOLIC BLOOD PRESSURE: 138 MMHG | TEMPERATURE: 97.9 F | BODY MASS INDEX: 35.13 KG/M2 | DIASTOLIC BLOOD PRESSURE: 88 MMHG | HEIGHT: 68 IN | WEIGHT: 231.8 LBS | HEART RATE: 113 BPM

## 2021-10-19 DIAGNOSIS — Z76.89 ENCOUNTER FOR CHOLECYSTECTOMY: ICD-10-CM

## 2021-10-19 DIAGNOSIS — E66.09 CLASS 2 OBESITY DUE TO EXCESS CALORIES WITHOUT SERIOUS COMORBIDITY WITH BODY MASS INDEX (BMI) OF 35.0 TO 35.9 IN ADULT: ICD-10-CM

## 2021-10-19 DIAGNOSIS — D72.829 LEUKOCYTOSIS, UNSPECIFIED TYPE: Primary | ICD-10-CM

## 2021-10-19 PROBLEM — E66.812 CLASS 2 OBESITY DUE TO EXCESS CALORIES WITHOUT SERIOUS COMORBIDITY WITH BODY MASS INDEX (BMI) OF 36.0 TO 36.9 IN ADULT: Status: RESOLVED | Noted: 2021-07-06 | Resolved: 2021-10-19

## 2021-10-19 PROBLEM — E66.812 CLASS 2 OBESITY DUE TO EXCESS CALORIES WITHOUT SERIOUS COMORBIDITY WITH BODY MASS INDEX (BMI) OF 35.0 TO 35.9 IN ADULT: Status: ACTIVE | Noted: 2021-10-19

## 2021-10-19 PROCEDURE — 99213 OFFICE O/P EST LOW 20 MIN: CPT | Performed by: NURSE PRACTITIONER

## 2021-10-19 RX ORDER — OMEPRAZOLE 20 MG/1
20 CAPSULE, DELAYED RELEASE ORAL DAILY
COMMUNITY
Start: 2021-10-11

## 2021-10-19 RX ORDER — AMLODIPINE BESYLATE 2.5 MG/1
2.5 TABLET ORAL
Qty: 30 TABLET | Refills: 2 | Status: SHIPPED | OUTPATIENT
Start: 2021-10-19 | End: 2022-01-17

## 2021-10-19 NOTE — PATIENT INSTRUCTIONS
CBC  Amlodipine 2.5 mg daily  Schedule eye exam  Monitor heart rate and blood pressure and call office   follow-up 3 months

## 2021-10-19 NOTE — PROGRESS NOTES
Meeta Garcia is a 26 y.o. female.     26-year-old obese white female with history of PCOS and headaches who comes in today for TCM visit post cholecystectomy.  Patient had cholecystectomy done on September 13 and ended up having to go back to the emergency room several days later for nausea vomiting with green bile.  Patient states now things are getting back to normal her abdominal pain has resided she still is on Prilosec for her stomach    Blood pressure 138/88 heart rate 112 she denies any chest pain, dyspnea, tachycardia or dizziness.  I am placing her on a small dose of amlodipine to bring down her diastolic blood pressure and patient is always mildly tachycardic even though she denies any caffeine use and I checked thyroid levels on last visit they were normal.  She is going to monitor blood pressure and heart rate and if heart rate continues to be higher than normal we will probably place her on a beta-blocker and rearrange her blood pressure medications .Patient had elevated white blood count for 6 months which was probably due to her gallbladder we will be checking a CBC today to make sure it has returned to normal.  So far patient has not had any chronic diarrhea and hopefully will not develop any    Weight is down 7 pounds at 232 with a BMI of 35.3.  Patient is up-to-date on Covid vaccinations and Pap smear she does need to schedule an eye exam.                 CBC  Amlodipine 2.5 mg daily  Schedule eye exam  Monitor heart rate and blood pressure and call office   follow-up 3 months       The following portions of the patient's history were reviewed and updated as appropriate: allergies, current medications, past family history, past medical history, past social history, past surgical history and problem list.    Vitals:    10/19/21 1408   BP: 138/88   BP Location: Right arm   Patient Position: Sitting   Cuff Size: Large Adult   Pulse: 113   Temp: 97.9 °F (36.6 °C)   TempSrc: Temporal   SpO2: 100%  "  Weight: 105 kg (231 lb 12.8 oz)   Height: 172.7 cm (68\")     Body mass index is 35.25 kg/m².    Past Medical History:   Diagnosis Date   • Hypertension    • PCOS (polycystic ovarian syndrome)      Past Surgical History:   Procedure Laterality Date   • CHOLECYSTECTOMY N/A 9/13/2021    Procedure: CHOLECYSTECTOMY LAPAROSCOPIC;  Surgeon: Clayton Gibson MD;  Location: [facility] MAIN OR;  Service: General;  Laterality: N/A;   • WISDOM TOOTH EXTRACTION  2011     Family History   Problem Relation Age of Onset   • Hypertension Mother    • Diabetes Father      Immunization History   Administered Date(s) Administered   • DTP / HiB 02/13/1996, 04/16/1996   • DTaP, Unspecified 09/09/1999   • FluLaval/Fluarix/Fluzone >6 09/01/2020   • FluMist 2-49yrs 08/27/2013   • HPV Quadrivalent 09/08/2010, 03/10/2011   • Hepatitis A 10/29/2018   • Influenza LAIV (Nasal) 01/24/2013   • Influenza, Unspecified 10/29/2018   • MMR 09/09/1999   • OPV 02/13/1996, 04/16/1996, 09/09/1999   • Tdap 04/16/2018       Admission on 09/19/2021, Discharged on 09/19/2021   Component Date Value Ref Range Status   • Glucose 09/19/2021 120* 65 - 99 mg/dL Final   • BUN 09/19/2021 8  6 - 20 mg/dL Final   • Creatinine 09/19/2021 0.63  0.57 - 1.00 mg/dL Final   • Sodium 09/19/2021 135* 136 - 145 mmol/L Final   • Potassium 09/19/2021 4.2  3.5 - 5.2 mmol/L Final   • Chloride 09/19/2021 100  98 - 107 mmol/L Final   • CO2 09/19/2021 19.0* 22.0 - 29.0 mmol/L Final   • Calcium 09/19/2021 9.4  8.6 - 10.5 mg/dL Final   • Total Protein 09/19/2021 8.1  6.0 - 8.5 g/dL Final   • Albumin 09/19/2021 4.10  3.50 - 5.20 g/dL Final   • ALT (SGPT) 09/19/2021 16  1 - 33 U/L Final   • AST (SGOT) 09/19/2021 14  1 - 32 U/L Final   • Alkaline Phosphatase 09/19/2021 102  39 - 117 U/L Final   • Total Bilirubin 09/19/2021 0.8  0.0 - 1.2 mg/dL Final   • eGFR Non  Amer 09/19/2021 114  >60 mL/min/1.73 Final   • Globulin 09/19/2021 4.0  gm/dL Final   • A/G Ratio 09/19/2021 1.0  g/dL Final "   • BUN/Creatinine Ratio 09/19/2021 12.7  7.0 - 25.0 Final   • Anion Gap 09/19/2021 16.0* 5.0 - 15.0 mmol/L Final   • Amylase 09/19/2021 30  28 - 100 U/L Final   • Lipase 09/19/2021 19  13 - 60 U/L Final   • HCG Qualitative 09/19/2021 Negative  Negative Final   • Color, UA 09/19/2021 Dark Yellow* Yellow, Straw Final    Result checked    • Appearance, UA 09/19/2021 Cloudy* Clear Final    Result checked    • pH, UA 09/19/2021 5.5  5.0 - 8.0 Final   • Specific Gravity, UA 09/19/2021 >=1.030  1.005 - 1.030 Final   • Glucose, UA 09/19/2021 Negative  Negative Final   • Ketones, UA 09/19/2021 15 mg/dL (1+)* Negative Final   • Bilirubin, UA 09/19/2021 Small (1+)* Negative Final    Confirmation testing is unavailable.  A serum bilirubin is recommended for further assessment.   • Blood, UA 09/19/2021 Trace* Negative Final   • Protein, UA 09/19/2021 100 mg/dL (2+)* Negative Final   • Leuk Esterase, UA 09/19/2021 Trace* Negative Final   • Nitrite, UA 09/19/2021 Negative  Negative Final   • Urobilinogen, UA 09/19/2021 0.2 E.U./dL  0.2 - 1.0 E.U./dL Final   • COVID19 09/19/2021 Not Detected  Not Detected - Ref. Range Final   • WBC 09/19/2021 18.50* 3.40 - 10.80 10*3/mm3 Final   • RBC 09/19/2021 5.12  3.77 - 5.28 10*6/mm3 Final   • Hemoglobin 09/19/2021 14.6  12.0 - 15.9 g/dL Final   • Hematocrit 09/19/2021 42.4  34.0 - 46.6 % Final   • MCV 09/19/2021 82.7  79.0 - 97.0 fL Final   • MCH 09/19/2021 28.5  26.6 - 33.0 pg Final   • MCHC 09/19/2021 34.4  31.5 - 35.7 g/dL Final   • RDW 09/19/2021 14.3  12.3 - 15.4 % Final   • RDW-SD 09/19/2021 41.6  37.0 - 54.0 fl Final   • MPV 09/19/2021 9.6  6.0 - 12.0 fL Final   • Platelets 09/19/2021 485* 140 - 450 10*3/mm3 Final   • Neutrophil % 09/19/2021 90.2* 42.7 - 76.0 % Final   • Lymphocyte % 09/19/2021 7.2* 19.6 - 45.3 % Final   • Monocyte % 09/19/2021 2.2* 5.0 - 12.0 % Final   • Eosinophil % 09/19/2021 0.1* 0.3 - 6.2 % Final   • Basophil % 09/19/2021 0.3  0.0 - 1.5 % Final   • Neutrophils,  Absolute 09/19/2021 16.60* 1.70 - 7.00 10*3/mm3 Final   • Lymphocytes, Absolute 09/19/2021 1.30  0.70 - 3.10 10*3/mm3 Final   • Monocytes, Absolute 09/19/2021 0.40  0.10 - 0.90 10*3/mm3 Final   • Eosinophils, Absolute 09/19/2021 0.00  0.00 - 0.40 10*3/mm3 Final   • Basophils, Absolute 09/19/2021 0.10  0.00 - 0.20 10*3/mm3 Final   • nRBC 09/19/2021 0.1  0.0 - 0.2 /100 WBC Final   • Extra Tube 09/19/2021 hold for add-on   Final    Auto resulted   • RBC, UA 09/19/2021 0-2* None Seen /HPF Final   • WBC, UA 09/19/2021 6-12* None Seen /HPF Final   • Bacteria, UA 09/19/2021 3+* None Seen /HPF Final   • Squamous Epithelial Cells, UA 09/19/2021 7-12* None Seen, 0-2 /HPF Final   • Hyaline Casts, UA 09/19/2021 3-6  None Seen /LPF Final   • Methodology 09/19/2021 Manual Light Microscopy   Final   • Blood Culture 09/19/2021 No growth at 5 days   Final   • Blood Culture 09/19/2021 No growth at 5 days   Final   • Urine Culture 09/19/2021 >100,000 CFU/mL Mixed Sonja Isolated   Final   • Lactate 09/19/2021 0.9  0.5 - 2.0 mmol/L Final    Serial Number: [id number]:  [id number]         Review of Systems   Constitutional: Negative.    HENT: Negative.    Respiratory: Negative.    Cardiovascular: Negative.    Gastrointestinal: Positive for GERD.   Genitourinary: Negative.    Musculoskeletal: Negative.    Neurological: Negative.    Psychiatric/Behavioral: Negative.        Objective   Physical Exam  Constitutional:       Appearance: Normal appearance.   Cardiovascular:      Rate and Rhythm: Normal rate and regular rhythm.      Pulses: Normal pulses.      Heart sounds: Normal heart sounds.   Pulmonary:      Effort: Pulmonary effort is normal.      Breath sounds: Normal breath sounds.   Abdominal:      General: Bowel sounds are normal.   Musculoskeletal:         General: Normal range of motion.   Skin:     General: Skin is warm and dry.   Neurological:      General: No focal deficit present.      Mental Status: She is alert and oriented  to person, place, and time.   Psychiatric:         Mood and Affect: Mood normal.         Behavior: Behavior normal.         Procedures    Assessment/Plan   Diagnoses and all orders for this visit:    1. Leukocytosis, unspecified type (Primary)  -     CBC & Differential    2. Class 2 obesity due to excess calories without serious comorbidity with body mass index (BMI) of 35.0 to 35.9 in adult    3. Encounter for cholecystectomy    Other orders  -     amLODIPine (NORVASC) 2.5 MG tablet; Take 1 tablet by mouth every night at bedtime.  Dispense: 30 tablet; Refill: 2          Current Outpatient Medications:   •  drospirenone-ethinyl estradiol (CA,OCELLA) 3-0.03 MG per tablet, Take 1 tablet by mouth Daily., Disp: , Rfl:   •  lisinopril-hydrochlorothiazide (PRINZIDE,ZESTORETIC) 20-12.5 MG per tablet, Take 1 tablet by mouth Daily. (Patient taking differently: Take 1 tablet by mouth Daily. HOLD 24 hours before surgery), Disp: 90 tablet, Rfl: 1  •  melatonin 5 MG tablet tablet, Take 10 mg by mouth. 2qhs, Disp: , Rfl:   •  omeprazole (priLOSEC) 20 MG capsule, Take 20 mg by mouth Daily., Disp: , Rfl:   •  amLODIPine (NORVASC) 2.5 MG tablet, Take 1 tablet by mouth every night at bedtime., Disp: 30 tablet, Rfl: 2

## 2021-10-20 LAB
BASOPHILS # BLD AUTO: 0.1 X10E3/UL (ref 0–0.2)
BASOPHILS NFR BLD AUTO: 1 %
EOSINOPHIL # BLD AUTO: 0.2 X10E3/UL (ref 0–0.4)
EOSINOPHIL NFR BLD AUTO: 2 %
ERYTHROCYTE [DISTWIDTH] IN BLOOD BY AUTOMATED COUNT: 12.9 % (ref 11.7–15.4)
HCT VFR BLD AUTO: 37.1 % (ref 34–46.6)
HGB BLD-MCNC: 11.6 G/DL (ref 11.1–15.9)
IMM GRANULOCYTES # BLD AUTO: 0 X10E3/UL (ref 0–0.1)
IMM GRANULOCYTES NFR BLD AUTO: 1 %
LYMPHOCYTES # BLD AUTO: 3 X10E3/UL (ref 0.7–3.1)
LYMPHOCYTES NFR BLD AUTO: 34 %
MCH RBC QN AUTO: 27 PG (ref 26.6–33)
MCHC RBC AUTO-ENTMCNC: 31.3 G/DL (ref 31.5–35.7)
MCV RBC AUTO: 87 FL (ref 79–97)
MONOCYTES # BLD AUTO: 0.5 X10E3/UL (ref 0.1–0.9)
MONOCYTES NFR BLD AUTO: 5 %
NEUTROPHILS # BLD AUTO: 5 X10E3/UL (ref 1.4–7)
NEUTROPHILS NFR BLD AUTO: 57 %
PLATELET # BLD AUTO: 372 X10E3/UL (ref 150–450)
RBC # BLD AUTO: 4.29 X10E6/UL (ref 3.77–5.28)
WBC # BLD AUTO: 8.8 X10E3/UL (ref 3.4–10.8)

## 2022-01-17 RX ORDER — AMLODIPINE BESYLATE 2.5 MG/1
TABLET ORAL
Qty: 90 TABLET | Refills: 0 | Status: SHIPPED | OUTPATIENT
Start: 2022-01-17 | End: 2022-05-23 | Stop reason: SDUPTHER

## 2022-02-15 RX ORDER — LISINOPRIL AND HYDROCHLOROTHIAZIDE 20; 12.5 MG/1; MG/1
TABLET ORAL
Qty: 90 TABLET | Refills: 0 | Status: SHIPPED | OUTPATIENT
Start: 2022-02-15 | End: 2022-05-23 | Stop reason: SDUPTHER

## 2022-04-27 RX ORDER — AMLODIPINE BESYLATE 2.5 MG/1
TABLET ORAL
Qty: 90 TABLET | Refills: 0 | OUTPATIENT
Start: 2022-04-27

## 2022-05-23 ENCOUNTER — OFFICE VISIT (OUTPATIENT)
Dept: FAMILY MEDICINE CLINIC | Facility: CLINIC | Age: 27
End: 2022-05-23

## 2022-05-23 VITALS
BODY MASS INDEX: 37.86 KG/M2 | DIASTOLIC BLOOD PRESSURE: 90 MMHG | WEIGHT: 249.8 LBS | HEART RATE: 95 BPM | HEIGHT: 68 IN | OXYGEN SATURATION: 100 % | SYSTOLIC BLOOD PRESSURE: 150 MMHG | TEMPERATURE: 98 F

## 2022-05-23 DIAGNOSIS — R73.09 ELEVATED GLUCOSE: Primary | ICD-10-CM

## 2022-05-23 DIAGNOSIS — Z13.220 LIPID SCREENING: ICD-10-CM

## 2022-05-23 DIAGNOSIS — R79.89 ABNORMAL CBC: ICD-10-CM

## 2022-05-23 DIAGNOSIS — E66.09 CLASS 2 OBESITY DUE TO EXCESS CALORIES WITHOUT SERIOUS COMORBIDITY WITH BODY MASS INDEX (BMI) OF 38.0 TO 38.9 IN ADULT: ICD-10-CM

## 2022-05-23 DIAGNOSIS — I10 ESSENTIAL HYPERTENSION: ICD-10-CM

## 2022-05-23 DIAGNOSIS — G44.229 CHRONIC TENSION-TYPE HEADACHE, NOT INTRACTABLE: ICD-10-CM

## 2022-05-23 PROBLEM — E66.812 CLASS 2 OBESITY DUE TO EXCESS CALORIES WITHOUT SERIOUS COMORBIDITY WITH BODY MASS INDEX (BMI) OF 38.0 TO 38.9 IN ADULT: Status: ACTIVE | Noted: 2022-05-23

## 2022-05-23 PROCEDURE — 99213 OFFICE O/P EST LOW 20 MIN: CPT | Performed by: NURSE PRACTITIONER

## 2022-05-23 RX ORDER — AMLODIPINE BESYLATE 2.5 MG/1
2.5 TABLET ORAL
Qty: 90 TABLET | Refills: 1 | Status: SHIPPED | OUTPATIENT
Start: 2022-05-23 | End: 2023-03-14 | Stop reason: SDUPTHER

## 2022-05-23 RX ORDER — LISINOPRIL AND HYDROCHLOROTHIAZIDE 20; 12.5 MG/1; MG/1
1 TABLET ORAL DAILY
Qty: 90 TABLET | Refills: 1 | Status: SHIPPED | OUTPATIENT
Start: 2022-05-23 | End: 2023-03-14 | Stop reason: SDUPTHER

## 2022-05-23 NOTE — PATIENT INSTRUCTIONS
Diet and exercise as discussed during exam  Taking amlodipine again  Make appointment with neurology  Fasting blood work  Follow-up 3 months

## 2022-05-23 NOTE — PROGRESS NOTES
"    Meeta Figueredo is a 26 y.o. female.     26-year-old obese white female with PCOS and headaches who comes in today for follow-up visit    Blood pressure elevated 150/98 heart rate 96 with small systolic murmur but patient states she has been out of her amlodipine at home.  She has agreed to bring her blood pressure cuff in to have it checked against a manual and she is going to record some blood pressures.  She denies any chest pain, dyspnea, tachycardia or dizziness    Patient still getting migraine she is on 4 different medications from neurology but they do not seem to be helping.  She plans to make a follow-up appointment with neurology.  She states she has cut back on her caffeine    Weight is up 18 pounds at 250 with a BMI of 38 and we had lengthy discussion on diet and exercise.  She states she is already started dieting again cutting back on caffeine as well.    Patient has had 2 COVID vaccines up-to-date on eye exam          Diet and exercise as discussed during exam  Taking amlodipine again  Make appointment with neurology  Fasting blood work  Follow-up 3 months       The following portions of the patient's history were reviewed and updated as appropriate: allergies, current medications, past family history, past medical history, past social history, past surgical history and problem list.    Vitals:    05/23/22 1520   BP: 150/90   BP Location: Left arm   Patient Position: Sitting   Cuff Size: Large Adult   Pulse: 95   Temp: 98 °F (36.7 °C)   TempSrc: Temporal   SpO2: 100%   Weight: 113 kg (249 lb 12.8 oz)   Height: 172.7 cm (68\")     Body mass index is 37.98 kg/m².    Past Medical History:   Diagnosis Date   • Class 2 obesity due to excess calories without serious comorbidity with body mass index (BMI) of 36.0 to 36.9 in adult 7/6/2021   • Hypertension    • PCOS (polycystic ovarian syndrome)      Past Surgical History:   Procedure Laterality Date   • CHOLECYSTECTOMY N/A 9/13/2021    Procedure: " CHOLECYSTECTOMY LAPAROSCOPIC;  Surgeon: Clayton Gibson MD;  Location: [facility] MAIN OR;  Service: General;  Laterality: N/A;   • WISDOM TOOTH EXTRACTION  2011     Family History   Problem Relation Age of Onset   • Hypertension Mother    • Diabetes Father      Immunization History   Administered Date(s) Administered   • DTP / HiB 02/13/1996, 04/16/1996   • DTaP, Unspecified 09/09/1999   • FluLaval/Fluarix/Fluzone >6 09/01/2020   • FluMist 2-49yrs 08/27/2013   • HPV Quadrivalent 09/08/2010, 03/10/2011   • Hepatitis A 10/29/2018   • Influenza LAIV (Nasal) 01/24/2013   • Influenza, Unspecified 10/29/2018   • MMR 09/09/1999   • OPV 02/13/1996, 04/16/1996, 09/09/1999   • Tdap 04/16/2018       Office Visit on 10/19/2021   Component Date Value Ref Range Status   • WBC 10/19/2021 8.8  3.4 - 10.8 x10E3/uL Final   • RBC 10/19/2021 4.29  3.77 - 5.28 x10E6/uL Final   • Hemoglobin 10/19/2021 11.6  11.1 - 15.9 g/dL Final   • Hematocrit 10/19/2021 37.1  34.0 - 46.6 % Final   • MCV 10/19/2021 87  79 - 97 fL Final   • MCH 10/19/2021 27.0  26.6 - 33.0 pg Final   • MCHC 10/19/2021 31.3 (A) 31.5 - 35.7 g/dL Final   • RDW 10/19/2021 12.9  11.7 - 15.4 % Final   • Platelets 10/19/2021 372  150 - 450 x10E3/uL Final   • Neutrophil Rel % 10/19/2021 57  Not Estab. % Final   • Lymphocyte Rel % 10/19/2021 34  Not Estab. % Final   • Monocyte Rel % 10/19/2021 5  Not Estab. % Final   • Eosinophil Rel % 10/19/2021 2  Not Estab. % Final   • Basophil Rel % 10/19/2021 1  Not Estab. % Final   • Neutrophils Absolute 10/19/2021 5.0  1.4 - 7.0 x10E3/uL Final   • Lymphocytes Absolute 10/19/2021 3.0  0.7 - 3.1 x10E3/uL Final   • Monocytes Absolute 10/19/2021 0.5  0.1 - 0.9 x10E3/uL Final   • Eosinophils Absolute 10/19/2021 0.2  0.0 - 0.4 x10E3/uL Final   • Basophils Absolute 10/19/2021 0.1  0.0 - 0.2 x10E3/uL Final   • Immature Granulocyte Rel % 10/19/2021 1  Not Estab. % Final   • Immature Grans Absolute 10/19/2021 0.0  0.0 - 0.1 x10E3/uL Final          Review of Systems   Constitutional: Positive for unexpected weight gain.   HENT: Negative.    Respiratory: Negative.    Cardiovascular: Negative.    Gastrointestinal: Negative.    Genitourinary: Negative.    Neurological: Positive for headache.   Psychiatric/Behavioral: Negative.        Objective   Physical Exam  Constitutional:       Appearance: Normal appearance.   HENT:      Head: Normocephalic.   Cardiovascular:      Rate and Rhythm: Normal rate and regular rhythm.      Pulses: Normal pulses.      Heart sounds: Normal heart sounds.   Pulmonary:      Effort: Pulmonary effort is normal.      Breath sounds: Normal breath sounds.   Musculoskeletal:         General: Normal range of motion.   Skin:     General: Skin is warm and dry.   Neurological:      General: No focal deficit present.      Mental Status: She is alert.   Psychiatric:         Behavior: Behavior normal.         Procedures    Assessment & Plan   Diagnoses and all orders for this visit:    1. Elevated glucose (Primary)  -     Comprehensive Metabolic Panel; Future  -     Hemoglobin A1c; Future    2. Lipid screening  -     Lipid Panel With LDL / HDL Ratio; Future    3. Abnormal CBC  -     CBC & Differential; Future    4. Essential hypertension    5. Chronic tension-type headache, not intractable    6. Class 2 obesity due to excess calories without serious comorbidity with body mass index (BMI) of 38.0 to 38.9 in adult    Other orders  -     lisinopril-hydrochlorothiazide (PRINZIDE,ZESTORETIC) 20-12.5 MG per tablet; Take 1 tablet by mouth Daily.  Dispense: 90 tablet; Refill: 1  -     amLODIPine (NORVASC) 2.5 MG tablet; Take 1 tablet by mouth every night at bedtime.  Dispense: 90 tablet; Refill: 1          Current Outpatient Medications:   •  amLODIPine (NORVASC) 2.5 MG tablet, Take 1 tablet by mouth every night at bedtime., Disp: 90 tablet, Rfl: 1  •  drospirenone-ethinyl estradiol (CA,OCELLA) 3-0.03 MG per tablet, Take 1 tablet by mouth  Daily., Disp: , Rfl:   •  lisinopril-hydrochlorothiazide (PRINZIDE,ZESTORETIC) 20-12.5 MG per tablet, Take 1 tablet by mouth Daily., Disp: 90 tablet, Rfl: 1  •  melatonin 5 MG tablet tablet, Take 10 mg by mouth. 2qhs, Disp: , Rfl:   •  omeprazole (priLOSEC) 20 MG capsule, Take 20 mg by mouth Daily., Disp: , Rfl:            Colleen Mcelroy, TADEO 5/23/2022 16:50 EDT  This note has been electronically signed

## 2022-11-14 RX ORDER — LISINOPRIL AND HYDROCHLOROTHIAZIDE 20; 12.5 MG/1; MG/1
TABLET ORAL
Qty: 90 TABLET | Refills: 0 | OUTPATIENT
Start: 2022-11-14

## 2022-11-14 RX ORDER — AMLODIPINE BESYLATE 2.5 MG/1
TABLET ORAL
Qty: 90 TABLET | Refills: 0 | OUTPATIENT
Start: 2022-11-14

## 2023-03-14 ENCOUNTER — OFFICE VISIT (OUTPATIENT)
Dept: FAMILY MEDICINE CLINIC | Facility: CLINIC | Age: 28
End: 2023-03-14
Payer: COMMERCIAL

## 2023-03-14 VITALS
BODY MASS INDEX: 37.5 KG/M2 | SYSTOLIC BLOOD PRESSURE: 178 MMHG | HEIGHT: 68 IN | HEART RATE: 112 BPM | DIASTOLIC BLOOD PRESSURE: 104 MMHG | TEMPERATURE: 97.7 F | OXYGEN SATURATION: 99 % | WEIGHT: 247.4 LBS

## 2023-03-14 DIAGNOSIS — Z13.220 LIPID SCREENING: ICD-10-CM

## 2023-03-14 DIAGNOSIS — Z00.00 PREVENTATIVE HEALTH CARE: Primary | ICD-10-CM

## 2023-03-14 DIAGNOSIS — G44.229 CHRONIC TENSION-TYPE HEADACHE, NOT INTRACTABLE: ICD-10-CM

## 2023-03-14 DIAGNOSIS — I10 ESSENTIAL HYPERTENSION: ICD-10-CM

## 2023-03-14 DIAGNOSIS — E66.01 CLASS 2 SEVERE OBESITY DUE TO EXCESS CALORIES WITH SERIOUS COMORBIDITY AND BODY MASS INDEX (BMI) OF 37.0 TO 37.9 IN ADULT: ICD-10-CM

## 2023-03-14 DIAGNOSIS — R00.0 TACHYCARDIA: ICD-10-CM

## 2023-03-14 DIAGNOSIS — Z83.3 FAMILY HISTORY OF DIABETES MELLITUS: ICD-10-CM

## 2023-03-14 PROBLEM — E66.812 CLASS 2 SEVERE OBESITY DUE TO EXCESS CALORIES WITH SERIOUS COMORBIDITY AND BODY MASS INDEX (BMI) OF 37.0 TO 37.9 IN ADULT: Status: ACTIVE | Noted: 2023-03-14

## 2023-03-14 PROCEDURE — 99213 OFFICE O/P EST LOW 20 MIN: CPT | Performed by: NURSE PRACTITIONER

## 2023-03-14 RX ORDER — LISINOPRIL AND HYDROCHLOROTHIAZIDE 20; 12.5 MG/1; MG/1
1 TABLET ORAL DAILY
Qty: 90 TABLET | Refills: 1 | Status: SHIPPED | OUTPATIENT
Start: 2023-03-14

## 2023-03-14 RX ORDER — AMLODIPINE BESYLATE 2.5 MG/1
2.5 TABLET ORAL
Qty: 90 TABLET | Refills: 1 | Status: SHIPPED | OUTPATIENT
Start: 2023-03-14

## 2023-03-14 NOTE — PATIENT INSTRUCTIONS
Fasting blood work  Restart blood pressure medications  Keep appointment with OB/GYN  Follow-up 6 months

## 2023-03-14 NOTE — PROGRESS NOTES
"    Meeta Figueredo is a 27 y.o. female.     History of Present Illness  27-year-old white female with history of hypertension, PCOS and headaches who comes in today for 6-month follow-up visit    Blood pressure 178/104 heart rate 112 with small systolic murmur she denies any chest pain, dyspnea, tachycardia or dizziness.  Patient has been out of her blood pressure medications.  I will reorder them    Patient states for what ever reasons her headaches have stopped and she feels a lot better    Patient really has no other new issues her weight is down 3 pounds at 247 with a BMI of 37.6.  She has had 2 COVID vaccines up-to-date on her eye exam and has an appointment for her Pap smear coming up          Fasting blood work  Restart blood pressure medications  Keep appointment with OB/GYN  Follow-up 6 months       The following portions of the patient's history were reviewed and updated as appropriate: allergies, current medications, past family history, past medical history, past social history, past surgical history and problem list.    Vitals:    03/14/23 1043 03/14/23 1048   BP: (!) 180/102 (!) 178/104   BP Location: Left arm Left arm   Patient Position: Sitting Sitting   Cuff Size: Adult Adult   Pulse: 112    Temp: 97.7 °F (36.5 °C)    TempSrc: Oral    SpO2: 99%    Weight: 112 kg (247 lb 6.4 oz)    Height: 172.7 cm (67.99\")      Body mass index is 37.63 kg/m².    Past Medical History:   Diagnosis Date   • Class 2 obesity due to excess calories without serious comorbidity with body mass index (BMI) of 36.0 to 36.9 in adult 7/6/2021   • Hypertension    • PCOS (polycystic ovarian syndrome)      Past Surgical History:   Procedure Laterality Date   • CHOLECYSTECTOMY N/A 9/13/2021    Procedure: CHOLECYSTECTOMY LAPAROSCOPIC;  Surgeon: Clayton Gibson MD;  Location: [facility] MAIN OR;  Service: General;  Laterality: N/A;   • WISDOM TOOTH EXTRACTION  2011     Family History   Problem Relation Age of Onset   • Hypertension " Mother    • Diabetes Father      Immunization History   Administered Date(s) Administered   • DTP / HiB 02/13/1996, 04/16/1996   • DTaP, Unspecified 09/09/1999   • FluLaval/Fluzone >6mos 09/01/2020   • FluMist 2-49yrs 08/27/2013   • HPV Quadrivalent 09/08/2010, 03/10/2011   • Hepatitis A 10/29/2018   • Influenza LAIV (Nasal) 01/24/2013   • Influenza, Unspecified 10/29/2018   • MMR 09/09/1999   • OPV 02/13/1996, 04/16/1996, 09/09/1999   • Tdap 04/16/2018       Results Encounter on 05/28/2022   Component Date Value Ref Range Status   • WBC 05/31/2022 10.5  3.4 - 10.8 x10E3/uL Final   • RBC 05/31/2022 4.76  3.77 - 5.28 x10E6/uL Final   • Hemoglobin 05/31/2022 13.1  11.1 - 15.9 g/dL Final   • Hematocrit 05/31/2022 40.0  34.0 - 46.6 % Final   • MCV 05/31/2022 84  79 - 97 fL Final   • MCH 05/31/2022 27.5  26.6 - 33.0 pg Final   • MCHC 05/31/2022 32.8  31.5 - 35.7 g/dL Final   • RDW 05/31/2022 13.6  11.7 - 15.4 % Final   • Platelets 05/31/2022 403  150 - 450 x10E3/uL Final   • Neutrophil Rel % 05/31/2022 51  Not Estab. % Final   • Lymphocyte Rel % 05/31/2022 39  Not Estab. % Final   • Monocyte Rel % 05/31/2022 5  Not Estab. % Final   • Eosinophil Rel % 05/31/2022 3  Not Estab. % Final   • Basophil Rel % 05/31/2022 1  Not Estab. % Final   • Neutrophils Absolute 05/31/2022 5.5  1.4 - 7.0 x10E3/uL Final   • Lymphocytes Absolute 05/31/2022 4.1 (H)  0.7 - 3.1 x10E3/uL Final   • Monocytes Absolute 05/31/2022 0.6  0.1 - 0.9 x10E3/uL Final   • Eosinophils Absolute 05/31/2022 0.3  0.0 - 0.4 x10E3/uL Final   • Basophils Absolute 05/31/2022 0.1  0.0 - 0.2 x10E3/uL Final   • Immature Granulocyte Rel % 05/31/2022 1  Not Estab. % Final   • Immature Grans Absolute 05/31/2022 0.1  0.0 - 0.1 x10E3/uL Final   • Glucose 05/31/2022 80  65 - 99 mg/dL Final   • BUN 05/31/2022 9  6 - 20 mg/dL Final   • Creatinine 05/31/2022 0.56 (L)  0.57 - 1.00 mg/dL Final   • EGFR Result 05/31/2022 129  >59 mL/min/1.73 Final   • BUN/Creatinine Ratio 05/31/2022  16  9 - 23 Final   • Sodium 05/31/2022 136  134 - 144 mmol/L Final   • Potassium 05/31/2022 4.3  3.5 - 5.2 mmol/L Final   • Chloride 05/31/2022 100  96 - 106 mmol/L Final   • Total CO2 05/31/2022 20  20 - 29 mmol/L Final   • Calcium 05/31/2022 9.3  8.7 - 10.2 mg/dL Final   • Total Protein 05/31/2022 7.1  6.0 - 8.5 g/dL Final   • Albumin 05/31/2022 4.1  3.9 - 5.0 g/dL Final   • Globulin 05/31/2022 3.0  1.5 - 4.5 g/dL Final   • A/G Ratio 05/31/2022 1.4  1.2 - 2.2 Final   • Total Bilirubin 05/31/2022 0.3  0.0 - 1.2 mg/dL Final   • Alkaline Phosphatase 05/31/2022 62  44 - 121 IU/L Final   • AST (SGOT) 05/31/2022 13  0 - 40 IU/L Final   • ALT (SGPT) 05/31/2022 16  0 - 32 IU/L Final   • Total Cholesterol 05/31/2022 149  100 - 199 mg/dL Final   • Triglycerides 05/31/2022 212 (H)  0 - 149 mg/dL Final   • HDL Cholesterol 05/31/2022 48  >39 mg/dL Final   • VLDL Cholesterol Gabe 05/31/2022 35  5 - 40 mg/dL Final   • LDL Chol Calc (NIH) 05/31/2022 66  0 - 99 mg/dL Final   • LDL/HDL RATIO 05/31/2022 1.4  0.0 - 3.2 ratio Final    Comment:                                     LDL/HDL Ratio                                              Men  Women                                1/2 Avg.Risk  1.0    1.5                                    Avg.Risk  3.6    3.2                                 2X Avg.Risk  6.2    5.0                                 3X Avg.Risk  8.0    6.1     • Hemoglobin A1C 05/31/2022 5.0  4.8 - 5.6 % Final    Comment:          Prediabetes: 5.7 - 6.4           Diabetes: >6.4           Glycemic control for adults with diabetes: <7.0           Review of Systems   Constitutional: Negative.    HENT: Negative.    Respiratory: Negative.    Cardiovascular: Negative.    Gastrointestinal: Negative.    Genitourinary: Negative.    Musculoskeletal: Negative.    Skin: Negative.    Neurological: Negative.    Psychiatric/Behavioral: Negative.        Objective   Physical Exam  Constitutional:       Appearance: Normal appearance.   HENT:       Head: Normocephalic.   Cardiovascular:      Rate and Rhythm: Normal rate and regular rhythm.      Pulses: Normal pulses.      Heart sounds: Murmur heard.   Pulmonary:      Effort: Pulmonary effort is normal.      Breath sounds: Normal breath sounds.   Abdominal:      General: Bowel sounds are normal.   Musculoskeletal:         General: Normal range of motion.   Skin:     General: Skin is warm and dry.   Neurological:      General: No focal deficit present.      Mental Status: She is alert.   Psychiatric:         Mood and Affect: Mood normal.         Behavior: Behavior normal.         Procedures    Assessment & Plan   Diagnoses and all orders for this visit:    1. Preventative health care (Primary)  -     CBC & Differential; Future    2. Family history of diabetes mellitus  -     Comprehensive Metabolic Panel; Future  -     Hemoglobin A1c; Future    3. Lipid screening  -     Lipid Panel With LDL / HDL Ratio; Future    4. Essential hypertension    5. Tachycardia    6. Chronic tension-type headache, not intractable    7. Class 2 severe obesity due to excess calories with serious comorbidity and body mass index (BMI) of 37.0 to 37.9 in adult (HCC)    Other orders  -     amLODIPine (NORVASC) 2.5 MG tablet; Take 1 tablet by mouth every night at bedtime.  Dispense: 90 tablet; Refill: 1  -     lisinopril-hydrochlorothiazide (PRINZIDE,ZESTORETIC) 20-12.5 MG per tablet; Take 1 tablet by mouth Daily.  Dispense: 90 tablet; Refill: 1          Current Outpatient Medications:   •  amLODIPine (NORVASC) 2.5 MG tablet, Take 1 tablet by mouth every night at bedtime., Disp: 90 tablet, Rfl: 1  •  drospirenone-ethinyl estradiol (AC,OCELLA) 3-0.03 MG per tablet, Take 1 tablet by mouth Daily., Disp: , Rfl:   •  lisinopril-hydrochlorothiazide (PRINZIDE,ZESTORETIC) 20-12.5 MG per tablet, Take 1 tablet by mouth Daily., Disp: 90 tablet, Rfl: 1  •  melatonin 5 MG tablet tablet, Take 2 tablets by mouth. 2qhs, Disp: , Rfl:   •   omeprazole (priLOSEC) 20 MG capsule, Take 1 capsule by mouth Daily., Disp: , Rfl:            Colleen Mcelroy, APRN 3/14/2023 10:58 EDT  This note has been electronically signed

## 2023-03-16 LAB
ALBUMIN SERPL-MCNC: 4.3 G/DL (ref 3.9–5)
ALBUMIN/GLOB SERPL: 1.3 {RATIO} (ref 1.2–2.2)
ALP SERPL-CCNC: 57 IU/L (ref 44–121)
ALT SERPL-CCNC: 9 IU/L (ref 0–32)
AST SERPL-CCNC: 12 IU/L (ref 0–40)
BASOPHILS # BLD AUTO: 0.1 X10E3/UL (ref 0–0.2)
BASOPHILS NFR BLD AUTO: 1 %
BILIRUB SERPL-MCNC: 0.4 MG/DL (ref 0–1.2)
BUN SERPL-MCNC: 12 MG/DL (ref 6–20)
BUN/CREAT SERPL: 18 (ref 9–23)
CALCIUM SERPL-MCNC: 9.5 MG/DL (ref 8.7–10.2)
CHLORIDE SERPL-SCNC: 100 MMOL/L (ref 96–106)
CHOLEST SERPL-MCNC: 188 MG/DL (ref 100–199)
CO2 SERPL-SCNC: 23 MMOL/L (ref 20–29)
CREAT SERPL-MCNC: 0.68 MG/DL (ref 0.57–1)
EGFRCR SERPLBLD CKD-EPI 2021: 122 ML/MIN/1.73
EOSINOPHIL # BLD AUTO: 0.2 X10E3/UL (ref 0–0.4)
EOSINOPHIL NFR BLD AUTO: 1 %
ERYTHROCYTE [DISTWIDTH] IN BLOOD BY AUTOMATED COUNT: 13.4 % (ref 11.7–15.4)
GLOBULIN SER CALC-MCNC: 3.4 G/DL (ref 1.5–4.5)
GLUCOSE SERPL-MCNC: 92 MG/DL (ref 70–99)
HBA1C MFR BLD: 5.1 % (ref 4.8–5.6)
HCT VFR BLD AUTO: 44.8 % (ref 34–46.6)
HDLC SERPL-MCNC: 54 MG/DL
HGB BLD-MCNC: 14.2 G/DL (ref 11.1–15.9)
IMM GRANULOCYTES # BLD AUTO: 0.1 X10E3/UL (ref 0–0.1)
IMM GRANULOCYTES NFR BLD AUTO: 1 %
LDLC SERPL CALC-MCNC: 90 MG/DL (ref 0–99)
LDLC/HDLC SERPL: 1.7 RATIO (ref 0–3.2)
LYMPHOCYTES # BLD AUTO: 3.9 X10E3/UL (ref 0.7–3.1)
LYMPHOCYTES NFR BLD AUTO: 31 %
MCH RBC QN AUTO: 26.9 PG (ref 26.6–33)
MCHC RBC AUTO-ENTMCNC: 31.7 G/DL (ref 31.5–35.7)
MCV RBC AUTO: 85 FL (ref 79–97)
MONOCYTES # BLD AUTO: 0.6 X10E3/UL (ref 0.1–0.9)
MONOCYTES NFR BLD AUTO: 4 %
NEUTROPHILS # BLD AUTO: 8.1 X10E3/UL (ref 1.4–7)
NEUTROPHILS NFR BLD AUTO: 62 %
PLATELET # BLD AUTO: 531 X10E3/UL (ref 150–450)
POTASSIUM SERPL-SCNC: 4.4 MMOL/L (ref 3.5–5.2)
PROT SERPL-MCNC: 7.7 G/DL (ref 6–8.5)
RBC # BLD AUTO: 5.27 X10E6/UL (ref 3.77–5.28)
SODIUM SERPL-SCNC: 139 MMOL/L (ref 134–144)
TRIGL SERPL-MCNC: 265 MG/DL (ref 0–149)
VLDLC SERPL CALC-MCNC: 44 MG/DL (ref 5–40)
WBC # BLD AUTO: 12.9 X10E3/UL (ref 3.4–10.8)

## 2023-03-24 ENCOUNTER — TELEPHONE (OUTPATIENT)
Dept: FAMILY MEDICINE CLINIC | Facility: CLINIC | Age: 28
End: 2023-03-24
Payer: COMMERCIAL

## 2023-03-24 NOTE — TELEPHONE ENCOUNTER
lmom to rtc.  SG    HUB TO READ:    Per Colleen - A1c and fasting blood sugar normal.  Triglycerides have worsened to 265 that is driven by sweets and carbs   CBC remains abnormal she may want to consider oncology to see why this is

## 2023-09-10 RX ORDER — AMLODIPINE BESYLATE 2.5 MG/1
TABLET ORAL
Qty: 90 TABLET | Refills: 0 | OUTPATIENT
Start: 2023-09-10

## 2023-09-10 RX ORDER — LISINOPRIL AND HYDROCHLOROTHIAZIDE 20; 12.5 MG/1; MG/1
TABLET ORAL
Qty: 90 TABLET | Refills: 0 | OUTPATIENT
Start: 2023-09-10

## 2023-09-18 NOTE — TELEPHONE ENCOUNTER
Caller: Meeta Figueredo    Relationship: Self    Best call back number: [phone]     Requested Prescriptions:   Requested Prescriptions     Pending Prescriptions Disp Refills    amLODIPine (NORVASC) 2.5 MG tablet 90 tablet 1     Sig: Take 1 tablet by mouth every night at bedtime.    lisinopril-hydrochlorothiazide (PRINZIDE,ZESTORETIC) 20-12.5 MG per tablet 90 tablet 1     Sig: Take 1 tablet by mouth Daily.        Pharmacy where request should be sent: [address] [phone] [facility] [phone] [facility]     Last office visit with prescribing clinician: 3/14/2023   Last telemedicine visit with prescribing clinician: Visit date not found   Next office visit with prescribing clinician: 9/27/2023     Additional details provided by patient: PATIENT HAS A 4 DAY SUPPLY OF THE LISINOPRIL. PATIENT WOULD LIKE TO KNOW IF THESE CAN BE REFILLED UNTIL HER APPOINTMENT ON 9/26/23    Does the patient have less than a 3 day supply:  [] Yes  [x] No    Would you like a call back once the refill request has been completed: [] Yes [x] No    If the office needs to give you a call back, can they leave a voicemail: [] Yes [x] No    Michaela Hameed Rep   09/18/23 09:20 EDT

## 2023-09-20 RX ORDER — LISINOPRIL AND HYDROCHLOROTHIAZIDE 20; 12.5 MG/1; MG/1
1 TABLET ORAL DAILY
Qty: 90 TABLET | Refills: 1 | Status: SHIPPED | OUTPATIENT
Start: 2023-09-20

## 2023-09-20 RX ORDER — AMLODIPINE BESYLATE 2.5 MG/1
2.5 TABLET ORAL
Qty: 90 TABLET | Refills: 1 | Status: SHIPPED | OUTPATIENT
Start: 2023-09-20

## 2023-09-27 ENCOUNTER — OFFICE VISIT (OUTPATIENT)
Dept: FAMILY MEDICINE CLINIC | Facility: CLINIC | Age: 28
End: 2023-09-27
Payer: COMMERCIAL

## 2023-09-27 VITALS
DIASTOLIC BLOOD PRESSURE: 98 MMHG | OXYGEN SATURATION: 100 % | BODY MASS INDEX: 37.77 KG/M2 | HEART RATE: 74 BPM | TEMPERATURE: 98 F | WEIGHT: 249.2 LBS | SYSTOLIC BLOOD PRESSURE: 168 MMHG | HEIGHT: 68 IN

## 2023-09-27 DIAGNOSIS — E78.1 PURE HYPERTRIGLYCERIDEMIA: ICD-10-CM

## 2023-09-27 DIAGNOSIS — I10 ESSENTIAL HYPERTENSION: ICD-10-CM

## 2023-09-27 DIAGNOSIS — Z83.3 FAMILY HISTORY OF DIABETES MELLITUS: ICD-10-CM

## 2023-09-27 DIAGNOSIS — E66.01 CLASS 2 SEVERE OBESITY DUE TO EXCESS CALORIES WITH SERIOUS COMORBIDITY AND BODY MASS INDEX (BMI) OF 37.0 TO 37.9 IN ADULT: ICD-10-CM

## 2023-09-27 DIAGNOSIS — K21.9 GASTROESOPHAGEAL REFLUX DISEASE, UNSPECIFIED WHETHER ESOPHAGITIS PRESENT: Primary | ICD-10-CM

## 2023-09-27 DIAGNOSIS — R79.89 ABNORMAL CBC: ICD-10-CM

## 2023-09-27 DIAGNOSIS — K21.9 GASTROESOPHAGEAL REFLUX DISEASE WITHOUT ESOPHAGITIS: ICD-10-CM

## 2023-09-27 RX ORDER — OMEPRAZOLE 20 MG/1
20 CAPSULE, DELAYED RELEASE ORAL DAILY
Qty: 90 CAPSULE | Status: CANCELLED | OUTPATIENT
Start: 2023-09-27

## 2023-09-27 NOTE — PROGRESS NOTES
"    Meeta Figueredo is a 28 y.o. female.     History of Present Illness  28-year-old white female with history of hypertension, GERD, PCOS and headaches who comes in today for follow-up visit    Blood pressure 158/90 heart rate 74 with small systolic murmur she denies any chest pain, dyspnea, tachycardia or dizziness.  We will increase her amlodipine to 5 mg and gave patient parameters to monitor blood pressure with    Patient continues to have reflux is taking omeprazole daily and is requesting I reorder that for her    Patient blood work back in March had elevated triglycerides with a normal A1c and fasting blood sugar.  She does have a family history diabetes we will be doing fasting labs which are due    Weight is up 2 pounds at 249 for BMI of 37.9.  She had 2 COVID vaccines up-to-date on eye exam and Pap smears                Fasting blood work  Increase amlodipine to 5 mg daily  Monitor blood pressure with parameters given  Follow-up 6 months     The following portions of the patient's history were reviewed and updated as appropriate: allergies, current medications, past family history, past medical history, past social history, past surgical history, and problem list.    Vitals:    09/27/23 0811 09/27/23 0813   BP: 158/90 168/98   BP Location: Right arm Right arm   Patient Position: Sitting Sitting   Cuff Size: Adult Adult   Pulse: 74    Temp: 98 °F (36.7 °C)    TempSrc: Infrared    SpO2: 100%    Weight: 113 kg (249 lb 3.2 oz)    Height: 172.7 cm (67.99\")      Body mass index is 37.9 kg/m².    Past Medical History:   Diagnosis Date    Class 2 obesity due to excess calories without serious comorbidity with body mass index (BMI) of 36.0 to 36.9 in adult 7/6/2021    Hypertension     PCOS (polycystic ovarian syndrome)      Past Surgical History:   Procedure Laterality Date    CHOLECYSTECTOMY N/A 9/13/2021    Procedure: CHOLECYSTECTOMY LAPAROSCOPIC;  Surgeon: Clayton Gibson MD;  Location: [facility] MAIN OR;  " Service: General;  Laterality: N/A;    WISDOM TOOTH EXTRACTION  2011     Family History   Problem Relation Age of Onset    Hypertension Mother     Diabetes Father      Immunization History   Administered Date(s) Administered    DTP / HiB 02/13/1996, 04/16/1996    DTaP, Unspecified 09/09/1999    FluMist 2-49yrs 08/27/2013    Fluzone (or Fluarix & Flulaval for VFC) >6mos 09/01/2020    HPV Quadrivalent 09/08/2010, 03/10/2011    Hepatitis A 10/29/2018    Influenza LAIV (Nasal) 01/24/2013    Influenza, Unspecified 10/29/2018    MMR 09/09/1999    OPV 02/13/1996, 04/16/1996, 09/09/1999    Tdap 04/16/2018       Office Visit on 03/14/2023   Component Date Value Ref Range Status    Hemoglobin A1C 03/15/2023 5.1  4.8 - 5.6 % Final    Comment:          Prediabetes: 5.7 - 6.4           Diabetes: >6.4           Glycemic control for adults with diabetes: <7.0      Total Cholesterol 03/15/2023 188  100 - 199 mg/dL Final    Triglycerides 03/15/2023 265 (H)  0 - 149 mg/dL Final    HDL Cholesterol 03/15/2023 54  >39 mg/dL Final    VLDL Cholesterol Gabe 03/15/2023 44 (H)  5 - 40 mg/dL Final    LDL Chol Calc (NIH) 03/15/2023 90  0 - 99 mg/dL Final    LDL/HDL RATIO 03/15/2023 1.7  0.0 - 3.2 ratio Final    Comment:                                     LDL/HDL Ratio                                              Men  Women                                1/2 Avg.Risk  1.0    1.5                                    Avg.Risk  3.6    3.2                                 2X Avg.Risk  6.2    5.0                                 3X Avg.Risk  8.0    6.1      Glucose 03/15/2023 92  70 - 99 mg/dL Final    BUN 03/15/2023 12  6 - 20 mg/dL Final    Creatinine 03/15/2023 0.68  0.57 - 1.00 mg/dL Final    EGFR Result 03/15/2023 122  >59 mL/min/1.73 Final    BUN/Creatinine Ratio 03/15/2023 18  9 - 23 Final    Sodium 03/15/2023 139  134 - 144 mmol/L Final    Potassium 03/15/2023 4.4  3.5 - 5.2 mmol/L Final    Chloride 03/15/2023 100  96 - 106 mmol/L Final    Total  CO2 03/15/2023 23  20 - 29 mmol/L Final    Calcium 03/15/2023 9.5  8.7 - 10.2 mg/dL Final    Total Protein 03/15/2023 7.7  6.0 - 8.5 g/dL Final    Albumin 03/15/2023 4.3  3.9 - 5.0 g/dL Final    Globulin 03/15/2023 3.4  1.5 - 4.5 g/dL Final    A/G Ratio 03/15/2023 1.3  1.2 - 2.2 Final    Total Bilirubin 03/15/2023 0.4  0.0 - 1.2 mg/dL Final    Alkaline Phosphatase 03/15/2023 57  44 - 121 IU/L Final    AST (SGOT) 03/15/2023 12  0 - 40 IU/L Final    ALT (SGPT) 03/15/2023 9  0 - 32 IU/L Final    WBC 03/15/2023 12.9 (H)  3.4 - 10.8 x10E3/uL Final    RBC 03/15/2023 5.27  3.77 - 5.28 x10E6/uL Final    Hemoglobin 03/15/2023 14.2  11.1 - 15.9 g/dL Final    Hematocrit 03/15/2023 44.8  34.0 - 46.6 % Final    MCV 03/15/2023 85  79 - 97 fL Final    MCH 03/15/2023 26.9  26.6 - 33.0 pg Final    MCHC 03/15/2023 31.7  31.5 - 35.7 g/dL Final    RDW 03/15/2023 13.4  11.7 - 15.4 % Final    Platelets 03/15/2023 531 (H)  150 - 450 x10E3/uL Final    Neutrophil Rel % 03/15/2023 62  Not Estab. % Final    Lymphocyte Rel % 03/15/2023 31  Not Estab. % Final    Monocyte Rel % 03/15/2023 4  Not Estab. % Final    Eosinophil Rel % 03/15/2023 1  Not Estab. % Final    Basophil Rel % 03/15/2023 1  Not Estab. % Final    Neutrophils Absolute 03/15/2023 8.1 (H)  1.4 - 7.0 x10E3/uL Final    Lymphocytes Absolute 03/15/2023 3.9 (H)  0.7 - 3.1 x10E3/uL Final    Monocytes Absolute 03/15/2023 0.6  0.1 - 0.9 x10E3/uL Final    Eosinophils Absolute 03/15/2023 0.2  0.0 - 0.4 x10E3/uL Final    Basophils Absolute 03/15/2023 0.1  0.0 - 0.2 x10E3/uL Final    Immature Granulocyte Rel % 03/15/2023 1  Not Estab. % Final    Immature Grans Absolute 03/15/2023 0.1  0.0 - 0.1 x10E3/uL Final         Review of Systems   Constitutional: Negative.    HENT: Negative.     Respiratory: Negative.     Cardiovascular: Negative.    Gastrointestinal: Negative.  Positive for GERD.   Genitourinary: Negative.    Musculoskeletal: Negative.    Skin: Negative.    Neurological: Negative.     Psychiatric/Behavioral: Negative.       Objective   Physical Exam  Constitutional:       Appearance: Normal appearance.   HENT:      Head: Normocephalic.   Cardiovascular:      Rate and Rhythm: Normal rate and regular rhythm.      Pulses: Normal pulses.      Heart sounds: Normal heart sounds.   Pulmonary:      Effort: Pulmonary effort is normal.      Breath sounds: Normal breath sounds.   Abdominal:      General: Bowel sounds are normal.   Musculoskeletal:         General: Normal range of motion.   Skin:     General: Skin is warm.   Neurological:      General: No focal deficit present.      Mental Status: She is alert and oriented to person, place, and time.   Psychiatric:         Mood and Affect: Mood normal.         Behavior: Behavior normal.       Procedures    Assessment & Plan   Diagnoses and all orders for this visit:    1. Gastroesophageal reflux disease, unspecified whether esophagitis present (Primary)    2. Abnormal CBC  -     CBC & Differential; Future    3. Pure hypertriglyceridemia  -     Lipid Panel With LDL / HDL Ratio; Future    4. Family history of diabetes mellitus  -     Comprehensive Metabolic Panel; Future    5. Essential hypertension    6. Class 2 severe obesity due to excess calories with serious comorbidity and body mass index (BMI) of 37.0 to 37.9 in adult    7. Gastroesophageal reflux disease without esophagitis           Current Outpatient Medications:     drospirenone-ethinyl estradiol (CA,OCELLA) 3-0.03 MG per tablet, Take 1 tablet by mouth Daily., Disp: , Rfl:     lisinopril-hydrochlorothiazide (PRINZIDE,ZESTORETIC) 20-12.5 MG per tablet, Take 1 tablet by mouth Daily., Disp: 90 tablet, Rfl: 1    melatonin 5 MG tablet tablet, Take 2 tablets by mouth. 2qhs, Disp: , Rfl:     omeprazole (priLOSEC) 20 MG capsule, Take 1 capsule by mouth Daily., Disp: , Rfl:            Colleen Mcelroy, TADEO 9/27/2023 08:35 EDT  This note has been electronically signed

## 2023-09-27 NOTE — PATIENT INSTRUCTIONS
Fasting blood work  Increase amlodipine to 5 mg daily  Monitor blood pressure with parameters given  Follow-up 6 months

## 2023-09-30 ENCOUNTER — LAB (OUTPATIENT)
Dept: LAB | Facility: HOSPITAL | Age: 28
End: 2023-09-30
Payer: COMMERCIAL

## 2023-09-30 DIAGNOSIS — Z83.3 FAMILY HISTORY OF DIABETES MELLITUS: ICD-10-CM

## 2023-09-30 DIAGNOSIS — R79.89 ABNORMAL CBC: ICD-10-CM

## 2023-09-30 DIAGNOSIS — E78.1 PURE HYPERGLYCERIDEMIA: Primary | ICD-10-CM

## 2023-09-30 LAB
ALBUMIN SERPL-MCNC: 3.8 G/DL (ref 3.5–5.2)
ALBUMIN/GLOB SERPL: 1.2 G/DL
ALP SERPL-CCNC: 56 U/L (ref 39–117)
ALT SERPL W P-5'-P-CCNC: 9 U/L (ref 1–33)
ANION GAP SERPL CALCULATED.3IONS-SCNC: 9 MMOL/L (ref 5–15)
AST SERPL-CCNC: 15 U/L (ref 1–32)
BASOPHILS # BLD AUTO: 0.1 10*3/MM3 (ref 0–0.2)
BASOPHILS NFR BLD AUTO: 1.4 % (ref 0–1.5)
BILIRUB SERPL-MCNC: 0.4 MG/DL (ref 0–1.2)
BUN SERPL-MCNC: 11 MG/DL (ref 6–20)
BUN/CREAT SERPL: 20.4 (ref 7–25)
CALCIUM SPEC-SCNC: 9.2 MG/DL (ref 8.6–10.5)
CHLORIDE SERPL-SCNC: 99 MMOL/L (ref 98–107)
CHOLEST SERPL-MCNC: 138 MG/DL (ref 0–200)
CO2 SERPL-SCNC: 25 MMOL/L (ref 22–29)
CREAT SERPL-MCNC: 0.54 MG/DL (ref 0.57–1)
DEPRECATED RDW RBC AUTO: 43.3 FL (ref 37–54)
EGFRCR SERPLBLD CKD-EPI 2021: 128.8 ML/MIN/1.73
EOSINOPHIL # BLD AUTO: 0.2 10*3/MM3 (ref 0–0.4)
EOSINOPHIL NFR BLD AUTO: 2.7 % (ref 0.3–6.2)
ERYTHROCYTE [DISTWIDTH] IN BLOOD BY AUTOMATED COUNT: 13.9 % (ref 12.3–15.4)
GLOBULIN UR ELPH-MCNC: 3.1 GM/DL
GLUCOSE SERPL-MCNC: 87 MG/DL (ref 65–99)
HCT VFR BLD AUTO: 36.3 % (ref 34–46.6)
HDLC SERPL QL: 2.71
HDLC SERPL-MCNC: 51 MG/DL (ref 40–60)
HGB BLD-MCNC: 12.2 G/DL (ref 12–15.9)
LDLC SERPL CALC-MCNC: 64 MG/DL (ref 0–100)
LYMPHOCYTES # BLD AUTO: 3 10*3/MM3 (ref 0.7–3.1)
LYMPHOCYTES NFR BLD AUTO: 36.7 % (ref 19.6–45.3)
MCH RBC QN AUTO: 28.4 PG (ref 26.6–33)
MCHC RBC AUTO-ENTMCNC: 33.5 G/DL (ref 31.5–35.7)
MCV RBC AUTO: 84.7 FL (ref 79–97)
MONOCYTES # BLD AUTO: 0.4 10*3/MM3 (ref 0.1–0.9)
MONOCYTES NFR BLD AUTO: 4.7 % (ref 5–12)
NEUTROPHILS NFR BLD AUTO: 4.5 10*3/MM3 (ref 1.7–7)
NEUTROPHILS NFR BLD AUTO: 54.5 % (ref 42.7–76)
NRBC BLD AUTO-RTO: 0 /100 WBC (ref 0–0.2)
PLATELET # BLD AUTO: 359 10*3/MM3 (ref 140–450)
PMV BLD AUTO: 10.3 FL (ref 6–12)
POTASSIUM SERPL-SCNC: 3.8 MMOL/L (ref 3.5–5.2)
PROT SERPL-MCNC: 6.9 G/DL (ref 6–8.5)
RBC # BLD AUTO: 4.29 10*6/MM3 (ref 3.77–5.28)
SODIUM SERPL-SCNC: 133 MMOL/L (ref 136–145)
TRIGL SERPL-MCNC: 128 MG/DL (ref 0–150)
VLDLC SERPL-MCNC: 23 MG/DL (ref 5–40)
WBC NRBC COR # BLD: 8.2 10*3/MM3 (ref 3.4–10.8)

## 2023-09-30 PROCEDURE — 80053 COMPREHEN METABOLIC PANEL: CPT

## 2023-09-30 PROCEDURE — 80061 LIPID PANEL: CPT

## 2023-09-30 PROCEDURE — 36415 COLL VENOUS BLD VENIPUNCTURE: CPT

## 2023-09-30 PROCEDURE — 85025 COMPLETE CBC W/AUTO DIFF WBC: CPT

## 2023-10-02 ENCOUNTER — TELEPHONE (OUTPATIENT)
Dept: FAMILY MEDICINE CLINIC | Facility: CLINIC | Age: 28
End: 2023-10-02
Payer: COMMERCIAL

## 2023-12-05 ENCOUNTER — HOSPITAL ENCOUNTER (EMERGENCY)
Facility: HOSPITAL | Age: 28
Discharge: HOME | End: 2023-12-05
Attending: EMERGENCY MEDICINE
Payer: COMMERCIAL

## 2023-12-05 ENCOUNTER — APPOINTMENT (OUTPATIENT)
Dept: GENERAL RADIOLOGY | Facility: HOSPITAL | Age: 28
End: 2023-12-05
Payer: COMMERCIAL

## 2023-12-05 VITALS
HEART RATE: 90 BPM | BODY MASS INDEX: 36.65 KG/M2 | RESPIRATION RATE: 16 BRPM | OXYGEN SATURATION: 100 % | HEIGHT: 68 IN | DIASTOLIC BLOOD PRESSURE: 93 MMHG | TEMPERATURE: 98.6 F | WEIGHT: 241.84 LBS | SYSTOLIC BLOOD PRESSURE: 163 MMHG

## 2023-12-05 DIAGNOSIS — M53.3 COCCYX PAIN: ICD-10-CM
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
## 2023-12-05 PROCEDURE — 72110 X-RAY EXAM L-2 SPINE 4/>VWS: CPT

## 2023-12-05 PROCEDURE — 72220 X-RAY EXAM SACRUM TAILBONE: CPT

## 2023-12-05 PROCEDURE — 99283 EMERGENCY DEPT VISIT LOW MDM: CPT

## 2023-12-05 RX ORDER — LIDOCAINE 50 MG/G
1 PATCH TOPICAL
Status: DISCONTINUED | OUTPATIENT
Start: 2023-12-05 | End: 2023-12-05 | Stop reason: HOSPADM

## 2023-12-05 RX ORDER — IBUPROFEN 400 MG/1
800 TABLET ORAL ONCE
Status: COMPLETED | OUTPATIENT
Start: 2023-12-05 | End: 2023-12-05

## 2023-12-05 RX ADMIN — IBUPROFEN 800 MG: 400 TABLET, FILM COATED ORAL at 03:44

## 2023-12-05 RX ADMIN — LIDOCAINE 1 PATCH: 50 PATCH CUTANEOUS at 03:44

## 2023-12-05 NOTE — DISCHARGE INSTRUCTIONS
Please take Tylenol and ibuprofen as needed for pain control.  Can use lidocaine patches for local relief, if these are too expensive via prescription can use over-the-counter formulations instead.  Please come back to the ER if symptoms worsen.  Please follow-up with your primary care provider in 1 week's time.

## 2023-12-05 NOTE — Clinical Note
[facility] EMERGENCY DEPARTMENT  [address] [place] [address]  Phone: [phone]    Meeta Figueredo was seen and treated in our emergency department on 12/5/2023.  She may return to work on 12/06/2023.         Thank you for choosing [facility].    Raphael Mccormick PA

## 2023-12-05 NOTE — ED PROVIDER NOTES
Subjective   History of Present Illness      Patient is a 28-year-old female comes in complaining of fall from standing position onto her tailbone that occurred around 10 PM earlier this evening.  Patient states she had worsened pain since that time.  Patient states pain is about an 8 out of 10 and hurts to walk and better with rest.  Patient denies any other injury.  Patient denies any numbness or tingling down her legs or saddle anesthesia or urinary or bowel dysfunction.      Review of Systems   Constitutional:  Negative for chills, fatigue and fever.   HENT:  Negative for congestion, sore throat, tinnitus and trouble swallowing.    Eyes:  Negative for photophobia, discharge and visual disturbance.   Respiratory:  Negative for cough and shortness of breath.    Cardiovascular:  Negative for chest pain and leg swelling.   Gastrointestinal:  Negative for abdominal pain, diarrhea, nausea and vomiting.   Genitourinary:  Negative for dysuria, flank pain and urgency.   Musculoskeletal:  Positive for back pain. Negative for arthralgias and myalgias.   Skin:  Negative for rash.   Neurological:  Negative for dizziness and headaches.   Psychiatric/Behavioral:  Negative for confusion.        Past Medical History:   Diagnosis Date    Class 2 obesity due to excess calories without serious comorbidity with body mass index (BMI) of 36.0 to 36.9 in adult 7/6/2021    Hypertension     PCOS (polycystic ovarian syndrome)        No Known Allergies    Past Surgical History:   Procedure Laterality Date    CHOLECYSTECTOMY N/A 9/13/2021    Procedure: CHOLECYSTECTOMY LAPAROSCOPIC;  Surgeon: Clayton Gibson MD;  Location: [facility] MAIN OR;  Service: General;  Laterality: N/A;    WISDOM TOOTH EXTRACTION  2011       Family History   Problem Relation Age of Onset    Hypertension Mother     Diabetes Father        Social History     Socioeconomic History    Marital status:    Tobacco Use    Smoking status: Never    Smokeless tobacco: Never    Vaping Use    Vaping Use: Never used   Substance and Sexual Activity    Alcohol use: Never    Drug use: Never    Sexual activity: Yes     Partners: Female     Birth control/protection: Condom           Objective   Physical Exam  Vitals and nursing note reviewed.   Constitutional:       General: She is not in acute distress.     Appearance: She is well-developed. She is not diaphoretic.   HENT:      Head: Normocephalic and atraumatic.      Right Ear: External ear normal.      Left Ear: External ear normal.      Nose: Nose normal.      Mouth/Throat:      Mouth: Mucous membranes are moist.   Eyes:      Extraocular Movements: Extraocular movements intact.      Conjunctiva/sclera: Conjunctivae normal.      Pupils: Pupils are equal, round, and reactive to light.   Cardiovascular:      Rate and Rhythm: Normal rate and regular rhythm.      Pulses: Normal pulses.      Heart sounds: Normal heart sounds.      Comments: S1, S2 audible.  Pulmonary:      Effort: Pulmonary effort is normal. No respiratory distress.      Breath sounds: Normal breath sounds. No wheezing.      Comments: On room air.  Abdominal:      General: Bowel sounds are normal. There is no distension.      Palpations: Abdomen is soft.      Tenderness: There is no abdominal tenderness. There is no guarding or rebound.   Musculoskeletal:         General: No tenderness or deformity. Normal range of motion.      Cervical back: Normal range of motion.      Comments: Lumbar spine exam shows no midline tenderness, no step-offs, no erythema present, no paraspinal tenderness and has normal range of motion of torso and hips bilaterally. Negative straight leg raise bilaterally. Sensation bilateral lower extremities normal. No reported saddle anesthesia.  Patient does report some tenderness in the sacrum area.     Skin:     General: Skin is warm.      Capillary Refill: Capillary refill takes less than 2 seconds.      Findings: No erythema or rash.   Neurological:       "Mental Status: She is alert and oriented to person, place, and time.      Cranial Nerves: No cranial nerve deficit.   Psychiatric:         Mood and Affect: Mood normal.         Behavior: Behavior normal.         Procedures           ED Course  ED Course as of 12/05/23 0434   Tue Dec 05, 2023   0405 Awaiting xray [RL]   0409 X-ray lumbar spine independently interpreted by myself shows no obvious fracture or dislocation. [RL]      ED Course User Index  [RL] Raphael Mccormick PA                                 Labs Reviewed - No data to display              Medical Decision Making    Differential diagnosis: sacral fracture, coccyx fracture, not ment to be an all inclusive list.  Chart review: Last office visit on 9/27/2023 with Dr. Mcelroy for follow-up regarding GERD, high blood pressure and obesity.    EKG: Not indicated  Imaging:    XR Spine Lumbar Complete 4+VW   Final Result   Impression:   Normal         Electronically Signed: Brayan Quezada MD     12/5/2023 4:28 AM EST     Workstation ID: [id number]      XR Sacrum & Coccyx   Final Result   Impression:   Normal         Electronically Signed: Brayan Quezada MD     12/5/2023 4:28 AM EST     Workstation ID: [id number]        Labs:  not indicated    Vitals:  /93   Pulse 90   Temp 98.6 °F (37 °C) (Oral)   Resp 16   Ht 172.7 cm (68\")   Wt 110 kg (241 lb 13.5 oz)   LMP 12/03/2023   SpO2 100%   BMI 36.77 kg/m²    Medications given:    Medications   lidocaine (LIDODERM) 5 % 1 patch (1 patch Transdermal Medication Applied 12/5/23 0344)   ibuprofen (ADVIL,MOTRIN) tablet 800 mg (800 mg Oral Given 12/5/23 0344)       Procedures:  not indicated  MDM: Patient is a 28-year-old female comes in complaining of fall and coccyx pain.  X-ray lumbar spine, sacrum and coccyx unremarkable for acute findings.  Patient was given lidocaine patch and ibuprofen and pain improved.  Patient given work note upon discharge. See full discharge instructions for further details.  Plan discussed with " patient and is agreeable with plan.      Final diagnoses:   Fall, initial encounter   Coccyx pain       ED Disposition  ED Disposition       ED Disposition   Discharge    Condition   Stable    Comment   --               [facility] EMERGENCY DEPARTMENT  [address] [address] [phone]  Go in 1 day  As needed, If symptoms worsen    Colleen Mcelroy, APRN  [address]  [address]  [place] [address] [phone]    Schedule an appointment as soon as possible for a visit in 1 week  As needed         Medication List      No changes were made to your prescriptions during this visit.            Raphael Mccormick PA  12/05/23 0434

## 2024-03-27 ENCOUNTER — OFFICE VISIT (OUTPATIENT)
Dept: FAMILY MEDICINE CLINIC | Facility: CLINIC | Age: 29
End: 2024-03-27
Payer: COMMERCIAL

## 2024-03-27 VITALS
HEIGHT: 68 IN | WEIGHT: 239 LBS | OXYGEN SATURATION: 98 % | BODY MASS INDEX: 36.22 KG/M2 | DIASTOLIC BLOOD PRESSURE: 84 MMHG | TEMPERATURE: 97.8 F | HEART RATE: 101 BPM | SYSTOLIC BLOOD PRESSURE: 144 MMHG

## 2024-03-27 DIAGNOSIS — Z00.00 PREVENTATIVE HEALTH CARE: ICD-10-CM

## 2024-03-27 DIAGNOSIS — Z83.3 FAMILY HISTORY OF DIABETES MELLITUS: ICD-10-CM

## 2024-03-27 DIAGNOSIS — E66.09 CLASS 2 OBESITY DUE TO EXCESS CALORIES WITHOUT SERIOUS COMORBIDITY WITH BODY MASS INDEX (BMI) OF 36.0 TO 36.9 IN ADULT: ICD-10-CM

## 2024-03-27 DIAGNOSIS — E78.1 HYPERTRIGLYCERIDEMIA: Primary | ICD-10-CM

## 2024-03-27 RX ORDER — LISINOPRIL AND HYDROCHLOROTHIAZIDE 20; 12.5 MG/1; MG/1
1 TABLET ORAL DAILY
Qty: 90 TABLET | Refills: 1 | Status: SHIPPED | OUTPATIENT
Start: 2024-03-27

## 2024-03-27 RX ORDER — AMLODIPINE BESYLATE 5 MG/1
5 TABLET ORAL
Qty: 90 TABLET | Refills: 1 | Status: SHIPPED | OUTPATIENT
Start: 2024-03-27

## 2024-03-27 RX ORDER — AMLODIPINE BESYLATE 2.5 MG/1
2.5 TABLET ORAL
Qty: 90 TABLET | Refills: 1 | Status: SHIPPED | OUTPATIENT
Start: 2024-03-27 | End: 2024-03-27

## 2024-03-27 RX ORDER — AMLODIPINE BESYLATE 2.5 MG/1
2.5 TABLET ORAL
COMMUNITY
Start: 2023-12-15 | End: 2024-03-27 | Stop reason: SDUPTHER

## 2024-03-27 NOTE — PROGRESS NOTES
"    Meeta Figueredo is a 28 y.o. female.     History of Present Illness  28-year-old white female with history of hypertension, GERD, PCOS and headaches who comes in today for 6-month follow-up visit and fasting blood work    Blood pressure 144/84 heart rate 100 with small systolic murmur she denies any chest pain, dyspnea, tachycardia or dizziness.  On last visit I increased her amlodipine to 5 mg but she has not been taking it that way.  Will reorder her 5 mg tablet gave her parameters to monitor blood pressure with and she will call the office if blood pressure does not remain in those parameters    Patient has been dieting and exercising on her own she is lost 10 pounds with a weight of 239 BMI of 36.4.  We did talk about assisting her with medication but patient wants to continue doing it on her own and see if she can do it without adding medications    She has had 2 COVID vaccines up-to-date on eye exam and Pap smears            Fasting blood work  Continue diet and exercise  Amlodipine 5 mg daily  Monitor blood pressure with parameters given and call the office if not staying in parameters  Follow-up 6 months       The following portions of the patient's history were reviewed and updated as appropriate: allergies, current medications, past family history, past medical history, past social history, past surgical history, and problem list.    Vitals:    03/27/24 0820 03/27/24 0826   BP: 150/90 144/84   BP Location: Right arm Right arm   Patient Position: Sitting Sitting   Cuff Size: Adult Adult   Pulse: 101    Temp: 97.8 °F (36.6 °C)    TempSrc: Infrared    SpO2: 98%    Weight: 108 kg (239 lb)    Height: 172.7 cm (67.99\")      Body mass index is 36.35 kg/m².    Past Medical History:   Diagnosis Date    Class 2 obesity due to excess calories without serious comorbidity with body mass index (BMI) of 36.0 to 36.9 in adult 7/6/2021    Hypertension     PCOS (polycystic ovarian syndrome)      Past Surgical " History:   Procedure Laterality Date    CHOLECYSTECTOMY N/A 9/13/2021    Procedure: CHOLECYSTECTOMY LAPAROSCOPIC;  Surgeon: Clayton Gibson MD;  Location: [facility] MAIN OR;  Service: General;  Laterality: N/A;    WISDOM TOOTH EXTRACTION  2011     Family History   Problem Relation Age of Onset    Hypertension Mother     Diabetes Father      Immunization History   Administered Date(s) Administered    DTP / HiB 02/13/1996, 04/16/1996    DTaP, Unspecified 09/09/1999    FluMist 2-49yrs 08/27/2013    Fluzone (or Fluarix & Flulaval for VFC) >6mos 09/01/2020    HPV Quadrivalent 09/08/2010, 03/10/2011    Hepatitis A 10/29/2018    Influenza LAIV (Nasal) 01/24/2013    Influenza, Unspecified 10/29/2018    MMR 09/09/1999    OPV 02/13/1996, 04/16/1996, 09/09/1999    Tdap 04/16/2018       Lab on 09/30/2023   Component Date Value Ref Range Status    Total Cholesterol 09/30/2023 138  0 - 200 mg/dL Final    Triglycerides 09/30/2023 128  0 - 150 mg/dL Final    HDL Cholesterol 09/30/2023 51  40 - 60 mg/dL Final    LDL Cholesterol  09/30/2023 64  0 - 100 mg/dL Final    VLDL Cholesterol 09/30/2023 23  5 - 40 mg/dL Final    Chol/HDL Ratio 09/30/2023 2.71   Final    Glucose 09/30/2023 87  65 - 99 mg/dL Final    BUN 09/30/2023 11  6 - 20 mg/dL Final    Creatinine 09/30/2023 0.54 (L)  0.57 - 1.00 mg/dL Final    Sodium 09/30/2023 133 (L)  136 - 145 mmol/L Final    Potassium 09/30/2023 3.8  3.5 - 5.2 mmol/L Final    Chloride 09/30/2023 99  98 - 107 mmol/L Final    CO2 09/30/2023 25.0  22.0 - 29.0 mmol/L Final    Calcium 09/30/2023 9.2  8.6 - 10.5 mg/dL Final    Total Protein 09/30/2023 6.9  6.0 - 8.5 g/dL Final    Albumin 09/30/2023 3.8  3.5 - 5.2 g/dL Final    ALT (SGPT) 09/30/2023 9  1 - 33 U/L Final    AST (SGOT) 09/30/2023 15  1 - 32 U/L Final    Alkaline Phosphatase 09/30/2023 56  39 - 117 U/L Final    Total Bilirubin 09/30/2023 0.4  0.0 - 1.2 mg/dL Final    Globulin 09/30/2023 3.1  gm/dL Final    A/G Ratio 09/30/2023 1.2  g/dL Final     BUN/Creatinine Ratio 09/30/2023 20.4  7.0 - 25.0 Final    Anion Gap 09/30/2023 9.0  5.0 - 15.0 mmol/L Final    eGFR 09/30/2023 128.8  >60.0 mL/min/1.73 Final    WBC 09/30/2023 8.20  3.40 - 10.80 10*3/mm3 Final    RBC 09/30/2023 4.29  3.77 - 5.28 10*6/mm3 Final    Hemoglobin 09/30/2023 12.2  12.0 - 15.9 g/dL Final    Hematocrit 09/30/2023 36.3  34.0 - 46.6 % Final    MCV 09/30/2023 84.7  79.0 - 97.0 fL Final    MCH 09/30/2023 28.4  26.6 - 33.0 pg Final    MCHC 09/30/2023 33.5  31.5 - 35.7 g/dL Final    RDW 09/30/2023 13.9  12.3 - 15.4 % Final    RDW-SD 09/30/2023 43.3  37.0 - 54.0 fl Final    MPV 09/30/2023 10.3  6.0 - 12.0 fL Final    Platelets 09/30/2023 359  140 - 450 10*3/mm3 Final    Neutrophil % 09/30/2023 54.5  42.7 - 76.0 % Final    Lymphocyte % 09/30/2023 36.7  19.6 - 45.3 % Final    Monocyte % 09/30/2023 4.7 (L)  5.0 - 12.0 % Final    Eosinophil % 09/30/2023 2.7  0.3 - 6.2 % Final    Basophil % 09/30/2023 1.4  0.0 - 1.5 % Final    Neutrophils, Absolute 09/30/2023 4.50  1.70 - 7.00 10*3/mm3 Final    Lymphocytes, Absolute 09/30/2023 3.00  0.70 - 3.10 10*3/mm3 Final    Monocytes, Absolute 09/30/2023 0.40  0.10 - 0.90 10*3/mm3 Final    Eosinophils, Absolute 09/30/2023 0.20  0.00 - 0.40 10*3/mm3 Final    Basophils, Absolute 09/30/2023 0.10  0.00 - 0.20 10*3/mm3 Final    nRBC 09/30/2023 0.0  0.0 - 0.2 /100 WBC Final         Review of Systems   Constitutional: Negative.    HENT: Negative.     Respiratory: Negative.     Cardiovascular: Negative.    Gastrointestinal: Negative.    Genitourinary: Negative.    Musculoskeletal: Negative.    Skin: Negative.    Neurological: Negative.    Psychiatric/Behavioral: Negative.         Objective   Physical Exam  Constitutional:       Appearance: Normal appearance.   HENT:      Head: Normocephalic.   Cardiovascular:      Rate and Rhythm: Normal rate and regular rhythm.      Pulses: Normal pulses.      Heart sounds: Normal heart sounds.   Pulmonary:      Effort: Pulmonary effort  is normal.      Breath sounds: Normal breath sounds.   Abdominal:      General: Bowel sounds are normal.   Musculoskeletal:         General: Normal range of motion.   Skin:     General: Skin is warm.   Neurological:      General: No focal deficit present.      Mental Status: She is alert and oriented to person, place, and time.   Psychiatric:         Mood and Affect: Mood normal.         Behavior: Behavior normal.         Procedures    Assessment & Plan   Diagnoses and all orders for this visit:    1. Hypertriglyceridemia (Primary)  -     Lipid Panel With LDL / HDL Ratio    2. Preventative health care  -     CBC & Differential  -     TSH+Free T4  -     T3    3. Family history of diabetes mellitus  -     Comprehensive Metabolic Panel  -     Hemoglobin A1c    4. Class 2 obesity due to excess calories without serious comorbidity with body mass index (BMI) of 36.0 to 36.9 in adult    Other orders  -     Discontinue: amLODIPine (NORVASC) 2.5 MG tablet; Take 1 tablet by mouth every night at bedtime.  Dispense: 90 tablet; Refill: 1  -     lisinopril-hydrochlorothiazide (PRINZIDE,ZESTORETIC) 20-12.5 MG per tablet; Take 1 tablet by mouth Daily.  Dispense: 90 tablet; Refill: 1  -     amLODIPine (NORVASC) 5 MG tablet; Take 1 tablet by mouth every night at bedtime.  Dispense: 90 tablet; Refill: 1           Current Outpatient Medications:     drospirenone-ethinyl estradiol (CA,OCELLA) 3-0.03 MG per tablet, Take 1 tablet by mouth Daily., Disp: , Rfl:     lisinopril-hydrochlorothiazide (PRINZIDE,ZESTORETIC) 20-12.5 MG per tablet, Take 1 tablet by mouth Daily., Disp: 90 tablet, Rfl: 1    melatonin 5 MG tablet tablet, Take 2 tablets by mouth. 2qhs, Disp: , Rfl:     amLODIPine (NORVASC) 5 MG tablet, Take 1 tablet by mouth every night at bedtime., Disp: 90 tablet, Rfl: 1           TADEO Yeboah 3/27/2024 09:04 EDT  This note has been electronically signed

## 2024-03-27 NOTE — PATIENT INSTRUCTIONS
Fasting blood work  Continue diet and exercise  Amlodipine 5 mg daily  Monitor blood pressure with parameters given and call the office if not staying in parameters  Follow-up 6 months

## 2024-03-30 ENCOUNTER — LAB (OUTPATIENT)
Dept: LAB | Facility: HOSPITAL | Age: 29
End: 2024-03-30
Payer: COMMERCIAL

## 2024-03-30 LAB
ALBUMIN SERPL-MCNC: 4 G/DL (ref 3.5–5.2)
ALBUMIN/GLOB SERPL: 1.3 G/DL
ALP SERPL-CCNC: 60 U/L (ref 39–117)
ALT SERPL W P-5'-P-CCNC: 13 U/L (ref 1–33)
ANION GAP SERPL CALCULATED.3IONS-SCNC: 14.1 MMOL/L (ref 5–15)
ANISOCYTOSIS BLD QL: ABNORMAL
AST SERPL-CCNC: 12 U/L (ref 1–32)
BILIRUB SERPL-MCNC: 0.5 MG/DL (ref 0–1.2)
BUN SERPL-MCNC: 10 MG/DL (ref 6–20)
BUN/CREAT SERPL: 16.4 (ref 7–25)
CALCIUM SPEC-SCNC: 9.4 MG/DL (ref 8.6–10.5)
CHLORIDE SERPL-SCNC: 101 MMOL/L (ref 98–107)
CHOLEST SERPL-MCNC: 174 MG/DL (ref 0–200)
CO2 SERPL-SCNC: 21.9 MMOL/L (ref 22–29)
CREAT SERPL-MCNC: 0.61 MG/DL (ref 0.57–1)
DEPRECATED RDW RBC AUTO: 41.9 FL (ref 37–54)
EGFRCR SERPLBLD CKD-EPI 2021: 125.1 ML/MIN/1.73
ERYTHROCYTE [DISTWIDTH] IN BLOOD BY AUTOMATED COUNT: 12.8 % (ref 12.3–15.4)
GLOBULIN UR ELPH-MCNC: 3.2 GM/DL
GLUCOSE SERPL-MCNC: 66 MG/DL (ref 65–99)
HBA1C MFR BLD: 5 % (ref 4.8–5.6)
HCT VFR BLD AUTO: 41.7 % (ref 34–46.6)
HDLC SERPL-MCNC: 56 MG/DL (ref 40–60)
HGB BLD-MCNC: 13.2 G/DL (ref 12–15.9)
LDLC SERPL CALC-MCNC: 89 MG/DL (ref 0–100)
LDLC/HDLC SERPL: 1.51 {RATIO}
LYMPHOCYTES # BLD MANUAL: 2.88 10*3/MM3 (ref 0.7–3.1)
LYMPHOCYTES NFR BLD MANUAL: 2 % (ref 5–12)
MCH RBC QN AUTO: 28.2 PG (ref 26.6–33)
MCHC RBC AUTO-ENTMCNC: 31.7 G/DL (ref 31.5–35.7)
MCV RBC AUTO: 89.1 FL (ref 79–97)
MONOCYTES # BLD: 0.2 10*3/MM3 (ref 0.1–0.9)
NEUTROPHILS # BLD AUTO: 6.86 10*3/MM3 (ref 1.7–7)
NEUTROPHILS NFR BLD MANUAL: 69 % (ref 42.7–76)
PLAT MORPH BLD: NORMAL
PLATELET # BLD AUTO: 426 10*3/MM3 (ref 140–450)
PMV BLD AUTO: 11.6 FL (ref 6–12)
POTASSIUM SERPL-SCNC: 4.1 MMOL/L (ref 3.5–5.2)
PROT SERPL-MCNC: 7.2 G/DL (ref 6–8.5)
RBC # BLD AUTO: 4.68 10*6/MM3 (ref 3.77–5.28)
SODIUM SERPL-SCNC: 137 MMOL/L (ref 136–145)
T3 SERPL-MCNC: 215 NG/DL (ref 80–200)
T4 FREE SERPL-MCNC: 1.37 NG/DL (ref 0.93–1.7)
TRIGL SERPL-MCNC: 167 MG/DL (ref 0–150)
TSH SERPL DL<=0.05 MIU/L-ACNC: 0.95 UIU/ML (ref 0.27–4.2)
VARIANT LYMPHS NFR BLD MANUAL: 29 % (ref 19.6–45.3)
VLDLC SERPL-MCNC: 29 MG/DL (ref 5–40)
WBC MORPH BLD: NORMAL
WBC NRBC COR # BLD AUTO: 9.94 10*3/MM3 (ref 3.4–10.8)

## 2024-03-30 PROCEDURE — 85007 BL SMEAR W/DIFF WBC COUNT: CPT | Performed by: NURSE PRACTITIONER

## 2024-03-30 PROCEDURE — 80050 GENERAL HEALTH PANEL: CPT | Performed by: NURSE PRACTITIONER

## 2024-03-30 PROCEDURE — 84480 ASSAY TRIIODOTHYRONINE (T3): CPT | Performed by: NURSE PRACTITIONER

## 2024-03-30 PROCEDURE — 36415 COLL VENOUS BLD VENIPUNCTURE: CPT | Performed by: NURSE PRACTITIONER

## 2024-03-30 PROCEDURE — 84439 ASSAY OF FREE THYROXINE: CPT | Performed by: NURSE PRACTITIONER

## 2024-03-30 PROCEDURE — 83036 HEMOGLOBIN GLYCOSYLATED A1C: CPT | Performed by: NURSE PRACTITIONER

## 2024-03-30 PROCEDURE — 80061 LIPID PANEL: CPT | Performed by: NURSE PRACTITIONER

## 2024-06-05 ENCOUNTER — OFFICE VISIT (OUTPATIENT)
Dept: FAMILY MEDICINE CLINIC | Facility: CLINIC | Age: 29
End: 2024-06-05
Payer: COMMERCIAL

## 2024-06-05 VITALS
DIASTOLIC BLOOD PRESSURE: 92 MMHG | HEART RATE: 119 BPM | OXYGEN SATURATION: 98 % | HEIGHT: 68 IN | TEMPERATURE: 97.7 F | SYSTOLIC BLOOD PRESSURE: 154 MMHG | WEIGHT: 241 LBS | BODY MASS INDEX: 36.53 KG/M2

## 2024-06-05 DIAGNOSIS — N76.0 BACTERIAL VAGINOSIS: ICD-10-CM

## 2024-06-05 DIAGNOSIS — B96.89 BACTERIAL VAGINOSIS: ICD-10-CM

## 2024-06-05 DIAGNOSIS — I10 ESSENTIAL HYPERTENSION: Primary | ICD-10-CM

## 2024-06-05 DIAGNOSIS — Z20.2 POSSIBLE EXPOSURE TO STD: ICD-10-CM

## 2024-06-05 PROCEDURE — 96372 THER/PROPH/DIAG INJ SC/IM: CPT | Performed by: NURSE PRACTITIONER

## 2024-06-05 PROCEDURE — 99214 OFFICE O/P EST MOD 30 MIN: CPT | Performed by: NURSE PRACTITIONER

## 2024-06-05 RX ORDER — AZITHROMYCIN 250 MG/1
TABLET, FILM COATED ORAL
Qty: 4 TABLET | Refills: 0 | Status: SHIPPED | OUTPATIENT
Start: 2024-06-05

## 2024-06-05 RX ORDER — AMLODIPINE BESYLATE 10 MG/1
10 TABLET ORAL DAILY
Qty: 90 TABLET | Refills: 1 | Status: SHIPPED | OUTPATIENT
Start: 2024-06-05

## 2024-06-05 RX ORDER — METRONIDAZOLE 500 MG/1
500 TABLET ORAL 3 TIMES DAILY
Qty: 30 TABLET | Refills: 0 | Status: SHIPPED | OUTPATIENT
Start: 2024-06-05

## 2024-06-05 NOTE — PROGRESS NOTES
Meeta Figueredo is a 28 y.o. female.     History of Present Illness  28-year-old white female history hypertension, GERD, PCOS and headaches who comes in today for follow-up visit    Blood pressure 160/90 heart rate 112 she denies any chest pain, dyspnea, tachycardia or dizziness.  On last visit I placed her on amlodipine 5 mg along with her lisinopril hydrochlorothiazide but did not do much to help her blood pressure.  She is under a lot of stress right now and works night shifts which does not help.  Will increase amlodipine to 10 mg gave her parameters to monitor blood pressure and told her to call if blood pressures do not remain within those parameters    She is complaining of an green foul-smelling vaginal discharge.  She is also been having relations with someone other than her  that has been unprotected.  Will place her on Flagyl and give her 1 g of azithromycin 1 g Rocephin to cover any other STDs that she might have been exposed to.  I encouraged patient to practice safe sex with a condom.  She is on birth control    Weight is 241 with a BMI of 36.7 and patient really does not seem to interested in making the effort to diet at this point hopefully that will change.  She has had 2 COVID vaccines up-to-date on eye exam and Pap smears            Flagyl 500 mg 3 times daily x 10 days  1 g azithromycin x 1  1 g Rocephin IM  Increase amlodipine to 10 mg daily along with lisinopril and hydrochlorothiazide  Monitor blood pressure with parameters given call the office if not within range  Practice safe sex       The following portions of the patient's history were reviewed and updated as appropriate: allergies, current medications, past family history, past medical history, past social history, past surgical history, and problem list.    Vitals:    06/05/24 0815 06/05/24 0819   BP: 160/90 154/92   BP Location: Left arm Left arm   Patient Position: Sitting Sitting   Cuff Size: Adult Adult   Pulse:  "119    Temp: 97.7 °F (36.5 °C)    TempSrc: Infrared    SpO2: 98%    Weight: 109 kg (241 lb)    Height: 172.7 cm (67.99\")      Body mass index is 36.65 kg/m².    Past Medical History:   Diagnosis Date    Class 2 obesity due to excess calories without serious comorbidity with body mass index (BMI) of 36.0 to 36.9 in adult 7/6/2021    Hypertension     PCOS (polycystic ovarian syndrome)      Past Surgical History:   Procedure Laterality Date    CHOLECYSTECTOMY N/A 9/13/2021    Procedure: CHOLECYSTECTOMY LAPAROSCOPIC;  Surgeon: Clayton Gibson MD;  Location: [facility] MAIN OR;  Service: General;  Laterality: N/A;    WISDOM TOOTH EXTRACTION  2011     Family History   Problem Relation Age of Onset    Hypertension Mother     Diabetes Father      Immunization History   Administered Date(s) Administered    DTP / HiB 02/13/1996, 04/16/1996    DTaP, Unspecified 09/09/1999    FluMist 2-49yrs 08/27/2013    Fluzone (or Fluarix & Flulaval for VFC) >6mos 09/01/2020    HPV Quadrivalent 09/08/2010, 03/10/2011    Hepatitis A 10/29/2018    Influenza LAIV (Nasal) 01/24/2013    Influenza, Unspecified 10/29/2018    MMR 09/09/1999    OPV 02/13/1996, 04/16/1996, 09/09/1999    Tdap 04/16/2018       Office Visit on 03/27/2024   Component Date Value Ref Range Status    Glucose 03/30/2024 66  65 - 99 mg/dL Final    BUN 03/30/2024 10  6 - 20 mg/dL Final    Creatinine 03/30/2024 0.61  0.57 - 1.00 mg/dL Final    Sodium 03/30/2024 137  136 - 145 mmol/L Final    Potassium 03/30/2024 4.1  3.5 - 5.2 mmol/L Final    Chloride 03/30/2024 101  98 - 107 mmol/L Final    CO2 03/30/2024 21.9 (L)  22.0 - 29.0 mmol/L Final    Calcium 03/30/2024 9.4  8.6 - 10.5 mg/dL Final    Total Protein 03/30/2024 7.2  6.0 - 8.5 g/dL Final    Albumin 03/30/2024 4.0  3.5 - 5.2 g/dL Final    ALT (SGPT) 03/30/2024 13  1 - 33 U/L Final    AST (SGOT) 03/30/2024 12  1 - 32 U/L Final    Alkaline Phosphatase 03/30/2024 60  39 - 117 U/L Final    Total Bilirubin 03/30/2024 0.5  0.0 - 1.2 " mg/dL Final    Globulin 03/30/2024 3.2  gm/dL Final    A/G Ratio 03/30/2024 1.3  g/dL Final    BUN/Creatinine Ratio 03/30/2024 16.4  7.0 - 25.0 Final    Anion Gap 03/30/2024 14.1  5.0 - 15.0 mmol/L Final    eGFR 03/30/2024 125.1  >60.0 mL/min/1.73 Final    Hemoglobin A1C 03/30/2024 5.00  4.80 - 5.60 % Final    Total Cholesterol 03/30/2024 174  0 - 200 mg/dL Final    Triglycerides 03/30/2024 167 (H)  0 - 150 mg/dL Final    HDL Cholesterol 03/30/2024 56  40 - 60 mg/dL Final    LDL Cholesterol  03/30/2024 89  0 - 100 mg/dL Final    VLDL Cholesterol 03/30/2024 29  5 - 40 mg/dL Final    LDL/HDL Ratio 03/30/2024 1.51   Final    TSH 03/30/2024 0.948  0.270 - 4.200 uIU/mL Final    Free T4 03/30/2024 1.37  0.93 - 1.70 ng/dL Final    T4 results may be falsely increased if patient taking Biotin.    T3, Total 03/30/2024 215.0 (H)  80.0 - 200.0 ng/dl Final    Neutrophil % 03/30/2024 69.0  42.7 - 76.0 % Final    Lymphocyte % 03/30/2024 29.0  19.6 - 45.3 % Final    Monocyte % 03/30/2024 2.0 (L)  5.0 - 12.0 % Final    Neutrophils Absolute 03/30/2024 6.86  1.70 - 7.00 10*3/mm3 Final    Lymphocytes Absolute 03/30/2024 2.88  0.70 - 3.10 10*3/mm3 Final    Monocytes Absolute 03/30/2024 0.20  0.10 - 0.90 10*3/mm3 Final    Anisocytosis 03/30/2024 Mod/2+  None Seen Final    WBC Morphology 03/30/2024 Normal  Normal Final    Platelet Morphology 03/30/2024 Normal  Normal Final    WBC 03/30/2024 9.94  3.40 - 10.80 10*3/mm3 Final    RBC 03/30/2024 4.68  3.77 - 5.28 10*6/mm3 Final    Hemoglobin 03/30/2024 13.2  12.0 - 15.9 g/dL Final    Hematocrit 03/30/2024 41.7  34.0 - 46.6 % Final    MCV 03/30/2024 89.1  79.0 - 97.0 fL Final    MCH 03/30/2024 28.2  26.6 - 33.0 pg Final    MCHC 03/30/2024 31.7  31.5 - 35.7 g/dL Final    RDW 03/30/2024 12.8  12.3 - 15.4 % Final    RDW-SD 03/30/2024 41.9  37.0 - 54.0 fl Final    MPV 03/30/2024 11.6  6.0 - 12.0 fL Final    Platelets 03/30/2024 426  140 - 450 10*3/mm3 Final         Review of Systems    Constitutional: Negative.    HENT: Negative.     Respiratory: Negative.     Cardiovascular: Negative.    Gastrointestinal: Negative.    Genitourinary:  Positive for vaginal discharge.   Musculoskeletal: Negative.    Skin: Negative.    Neurological: Negative.    Psychiatric/Behavioral: Negative.         Objective   Physical Exam  Constitutional:       Appearance: Normal appearance.   HENT:      Head: Normocephalic.   Cardiovascular:      Rate and Rhythm: Normal rate and regular rhythm.      Pulses: Normal pulses.      Heart sounds: Normal heart sounds.   Pulmonary:      Effort: Pulmonary effort is normal.      Breath sounds: Normal breath sounds.   Abdominal:      General: Bowel sounds are normal.   Genitourinary:     Comments: Green foul-smelling discharge no other symptoms  Musculoskeletal:         General: Normal range of motion.   Skin:     General: Skin is warm.   Neurological:      General: No focal deficit present.      Mental Status: She is alert and oriented to person, place, and time.   Psychiatric:         Mood and Affect: Mood normal.         Behavior: Behavior normal.         Procedures    Assessment & Plan   Diagnoses and all orders for this visit:    1. Essential hypertension (Primary)  -     cefTRIAXone (ROCEPHIN) 350 mg/ml in lidocaine 1% IM syringe (1 gm vial)    2. Bacterial vaginosis    3. Possible exposure to STD    Other orders  -     amLODIPine (NORVASC) 10 MG tablet; Take 1 tablet by mouth Daily.  Dispense: 90 tablet; Refill: 1  -     metroNIDAZOLE (Flagyl) 500 MG tablet; Take 1 tablet by mouth 3 (Three) Times a Day.  Dispense: 30 tablet; Refill: 0  -     azithromycin (Zithromax Z-Aayush) 250 MG tablet; Take 4 tabs now x1  Dispense: 4 tablet; Refill: 0           Current Outpatient Medications:     drospirenone-ethinyl estradiol (CA,OCELLA) 3-0.03 MG per tablet, Take 1 tablet by mouth Daily., Disp: , Rfl:     lisinopril-hydrochlorothiazide (PRINZIDE,ZESTORETIC) 20-12.5 MG per tablet, Take 1  tablet by mouth Daily., Disp: 90 tablet, Rfl: 1    melatonin 5 MG tablet tablet, Take 2 tablets by mouth. 2qhs, Disp: , Rfl:     amLODIPine (NORVASC) 10 MG tablet, Take 1 tablet by mouth Daily., Disp: 90 tablet, Rfl: 1    azithromycin (Zithromax Z-Aayush) 250 MG tablet, Take 4 tabs now x1, Disp: 4 tablet, Rfl: 0    metroNIDAZOLE (Flagyl) 500 MG tablet, Take 1 tablet by mouth 3 (Three) Times a Day., Disp: 30 tablet, Rfl: 0    Current Facility-Administered Medications:     cefTRIAXone (ROCEPHIN) 350 mg/ml in lidocaine 1% IM syringe (1 gm vial), 1 g, Intramuscular, Once, Colleen Mcelroy, TADEO Prado 6/5/2024 09:36 EDT  This note has been electronically signed

## 2024-06-05 NOTE — PATIENT INSTRUCTIONS
Flagyl 500 mg 3 times daily x 10 days  1 g azithromycin x 1  1 g Rocephin IM  Increase amlodipine to 10 mg daily along with lisinopril and hydrochlorothiazide  Monitor blood pressure with parameters given call the office if not within range  Practice safe sex

## 2024-09-26 ENCOUNTER — OFFICE VISIT (OUTPATIENT)
Dept: FAMILY MEDICINE CLINIC | Facility: CLINIC | Age: 29
End: 2024-09-26
Payer: COMMERCIAL

## 2024-09-26 VITALS
DIASTOLIC BLOOD PRESSURE: 80 MMHG | OXYGEN SATURATION: 96 % | HEART RATE: 111 BPM | WEIGHT: 246 LBS | SYSTOLIC BLOOD PRESSURE: 130 MMHG | HEIGHT: 68 IN | TEMPERATURE: 98.4 F | BODY MASS INDEX: 37.28 KG/M2

## 2024-09-26 DIAGNOSIS — E05.90 HYPERTHYROIDISM: ICD-10-CM

## 2024-09-26 DIAGNOSIS — Z83.3 FAMILY HISTORY OF DIABETES MELLITUS: ICD-10-CM

## 2024-09-26 DIAGNOSIS — E66.01 CLASS 2 SEVERE OBESITY DUE TO EXCESS CALORIES WITH SERIOUS COMORBIDITY AND BODY MASS INDEX (BMI) OF 37.0 TO 37.9 IN ADULT: ICD-10-CM

## 2024-09-26 DIAGNOSIS — E78.1 HYPERTRIGLYCERIDEMIA: Primary | ICD-10-CM

## 2024-09-26 PROBLEM — E66.812 CLASS 2 SEVERE OBESITY DUE TO EXCESS CALORIES WITH SERIOUS COMORBIDITY AND BODY MASS INDEX (BMI) OF 37.0 TO 37.9 IN ADULT: Status: ACTIVE | Noted: 2021-02-08

## 2024-09-26 PROCEDURE — 99213 OFFICE O/P EST LOW 20 MIN: CPT | Performed by: NURSE PRACTITIONER

## 2024-09-26 RX ORDER — AMLODIPINE BESYLATE 10 MG/1
10 TABLET ORAL DAILY
Qty: 90 TABLET | Refills: 1 | Status: SHIPPED | OUTPATIENT
Start: 2024-09-26

## 2024-09-26 RX ORDER — LISINOPRIL AND HYDROCHLOROTHIAZIDE 12.5; 2 MG/1; MG/1
1 TABLET ORAL DAILY
Qty: 90 TABLET | Refills: 1 | Status: SHIPPED | OUTPATIENT
Start: 2024-09-26

## 2024-09-28 ENCOUNTER — LAB (OUTPATIENT)
Dept: LAB | Facility: HOSPITAL | Age: 29
End: 2024-09-28
Payer: COMMERCIAL

## 2024-09-28 DIAGNOSIS — E78.1 HYPERTRIGLYCERIDEMIA: ICD-10-CM

## 2024-09-28 DIAGNOSIS — E05.90 HYPERTHYROIDISM: ICD-10-CM

## 2024-09-28 DIAGNOSIS — Z83.3 FAMILY HISTORY OF DIABETES MELLITUS: ICD-10-CM

## 2024-09-28 LAB
ALBUMIN SERPL-MCNC: 4.1 G/DL (ref 3.5–5.2)
ALBUMIN/GLOB SERPL: 1.2 G/DL
ALP SERPL-CCNC: 63 U/L (ref 39–117)
ALT SERPL W P-5'-P-CCNC: 10 U/L (ref 1–33)
ANION GAP SERPL CALCULATED.3IONS-SCNC: 11.7 MMOL/L (ref 5–15)
AST SERPL-CCNC: 11 U/L (ref 1–32)
BILIRUB SERPL-MCNC: 0.5 MG/DL (ref 0–1.2)
BUN SERPL-MCNC: 8 MG/DL (ref 6–20)
BUN/CREAT SERPL: 12.7 (ref 7–25)
CALCIUM SPEC-SCNC: 9.4 MG/DL (ref 8.6–10.5)
CHLORIDE SERPL-SCNC: 103 MMOL/L (ref 98–107)
CHOLEST SERPL-MCNC: 126 MG/DL (ref 0–200)
CO2 SERPL-SCNC: 23.3 MMOL/L (ref 22–29)
CREAT SERPL-MCNC: 0.63 MG/DL (ref 0.57–1)
EGFRCR SERPLBLD CKD-EPI 2021: 123.3 ML/MIN/1.73
GLOBULIN UR ELPH-MCNC: 3.4 GM/DL
GLUCOSE SERPL-MCNC: 84 MG/DL (ref 65–99)
HBA1C MFR BLD: 5.12 % (ref 4.8–5.6)
HDLC SERPL-MCNC: 49 MG/DL (ref 40–60)
LDLC SERPL CALC-MCNC: 54 MG/DL (ref 0–100)
LDLC/HDLC SERPL: 1.04 {RATIO}
POTASSIUM SERPL-SCNC: 4.2 MMOL/L (ref 3.5–5.2)
PROT SERPL-MCNC: 7.5 G/DL (ref 6–8.5)
SODIUM SERPL-SCNC: 138 MMOL/L (ref 136–145)
T4 FREE SERPL-MCNC: 1 NG/DL (ref 0.93–1.7)
TRIGL SERPL-MCNC: 130 MG/DL (ref 0–150)
TSH SERPL DL<=0.05 MIU/L-ACNC: 4.33 UIU/ML (ref 0.27–4.2)
VLDLC SERPL-MCNC: 23 MG/DL (ref 5–40)

## 2024-09-28 PROCEDURE — 84439 ASSAY OF FREE THYROXINE: CPT

## 2024-09-28 PROCEDURE — 84480 ASSAY TRIIODOTHYRONINE (T3): CPT | Performed by: NURSE PRACTITIONER

## 2024-09-28 PROCEDURE — 80061 LIPID PANEL: CPT

## 2024-09-28 PROCEDURE — 36415 COLL VENOUS BLD VENIPUNCTURE: CPT

## 2024-09-28 PROCEDURE — 84443 ASSAY THYROID STIM HORMONE: CPT

## 2024-09-28 PROCEDURE — 83036 HEMOGLOBIN GLYCOSYLATED A1C: CPT

## 2024-09-28 PROCEDURE — 80053 COMPREHEN METABOLIC PANEL: CPT

## 2024-09-29 LAB — T3 SERPL-MCNC: 256 NG/DL (ref 71–180)

## 2025-01-01 ENCOUNTER — TELEMEDICINE (OUTPATIENT)
Dept: FAMILY MEDICINE CLINIC | Facility: TELEHEALTH | Age: 30
End: 2025-01-01

## 2025-01-01 DIAGNOSIS — K52.9 GASTROENTERITIS: Primary | ICD-10-CM

## 2025-01-01 RX ORDER — ONDANSETRON 4 MG/1
4 TABLET, ORALLY DISINTEGRATING ORAL EVERY 8 HOURS PRN
Qty: 15 TABLET | Refills: 0 | Status: SHIPPED | OUTPATIENT
Start: 2025-01-01 | End: 2025-01-06

## 2025-01-01 NOTE — LETTER
[facility]  [facility]  [address]  [address]  [facility] [place] [address]  Phone: [phone]  Fax: [phone]    Meeta Figueredo was seen and treated in our Urgent Care on 1/1/2025.  She may return to work on 1/1/24    Please excuse for 12/30 and 12/31    .        Thank you for choosing [facility].      TADEO Boles

## 2025-01-01 NOTE — PROGRESS NOTES
You have chosen to receive care through a telehealth visit.  Do you consent to use a video/audio connection for your medical care today? Yes     CHIEF COMPLAINT  No chief complaint on file.        HPI  Meeta Figueredo is a 29 y.o. female  presents with complaint of vomiting and diarrhea that started on Monday.  She states that the vomiting has resolved, but she is still nauseated and has diarrhea    Review of Systems   Gastrointestinal:  Positive for diarrhea, nausea and vomiting.   All other systems reviewed and are negative.      Past Medical History:   Diagnosis Date    Class 2 obesity due to excess calories without serious comorbidity with body mass index (BMI) of 36.0 to 36.9 in adult 7/6/2021    Hypertension     PCOS (polycystic ovarian syndrome)        Family History   Problem Relation Age of Onset    Hypertension Mother     Diabetes Father        Social History     Socioeconomic History    Marital status:    Tobacco Use    Smoking status: Never     Passive exposure: Never    Smokeless tobacco: Never   Vaping Use    Vaping status: Never Used   Substance and Sexual Activity    Alcohol use: Never    Drug use: Never    Sexual activity: Yes     Partners: Female     Birth control/protection: Condom       Meeta Figueredo  reports that she has never smoked. She has never been exposed to tobacco smoke. She has never used smokeless tobacco.        There were no vitals taken for this visit.    PHYSICAL EXAM  Physical Exam   Constitutional: She appears well-developed and well-nourished.   HENT:   Head: Normocephalic.   Eyes: Pupils are equal, round, and reactive to light.   Pulmonary/Chest: Effort normal.   Musculoskeletal: Normal range of motion.   Neurological: She is alert.   Psychiatric: She has a normal mood and affect.       Results for orders placed or performed in visit on 09/28/24   Comprehensive Metabolic Panel    Collection Time: 09/28/24  6:40 AM    Specimen: Blood   Result Value Ref  Range    Glucose 84 65 - 99 mg/dL    BUN 8 6 - 20 mg/dL    Creatinine 0.63 0.57 - 1.00 mg/dL    Sodium 138 136 - 145 mmol/L    Potassium 4.2 3.5 - 5.2 mmol/L    Chloride 103 98 - 107 mmol/L    CO2 23.3 22.0 - 29.0 mmol/L    Calcium 9.4 8.6 - 10.5 mg/dL    Total Protein 7.5 6.0 - 8.5 g/dL    Albumin 4.1 3.5 - 5.2 g/dL    ALT (SGPT) 10 1 - 33 U/L    AST (SGOT) 11 1 - 32 U/L    Alkaline Phosphatase 63 39 - 117 U/L    Total Bilirubin 0.5 0.0 - 1.2 mg/dL    Globulin 3.4 gm/dL    A/G Ratio 1.2 g/dL    BUN/Creatinine Ratio 12.7 7.0 - 25.0    Anion Gap 11.7 5.0 - 15.0 mmol/L    eGFR 123.3 >60.0 mL/min/1.73   Hemoglobin A1c    Collection Time: 09/28/24  6:40 AM    Specimen: Blood   Result Value Ref Range    Hemoglobin A1C 5.12 4.80 - 5.60 %   Lipid Panel With LDL / HDL Ratio    Collection Time: 09/28/24  6:40 AM    Specimen: Blood   Result Value Ref Range    Total Cholesterol 126 0 - 200 mg/dL    Triglycerides 130 0 - 150 mg/dL    HDL Cholesterol 49 40 - 60 mg/dL    LDL Cholesterol  54 0 - 100 mg/dL    VLDL Cholesterol 23 5 - 40 mg/dL    LDL/HDL Ratio 1.04    TSH+Free T4    Collection Time: 09/28/24  6:40 AM    Specimen: Blood   Result Value Ref Range    TSH 4.330 (H) 0.270 - 4.200 uIU/mL    Free T4 1.00 0.93 - 1.70 ng/dL       Diagnoses and all orders for this visit:    1. Gastroenteritis (Primary)  -     ondansetron ODT (ZOFRAN-ODT) 4 MG disintegrating tablet; Place 1 tablet on the tongue Every 8 (Eight) Hours As Needed for Nausea or Vomiting for up to 5 days.  Dispense: 15 tablet; Refill: 0          FOLLOW-UP  As discussed during visit with PCP/[facility] if no improvement or Urgent Care/Emergency Department if worsening of symptoms    Patient verbalizes understanding of medication dosage, comfort measures, instructions for treatment and follow-up.    Marie Cervantes, APRN  01/01/2025  06:48 EST    Mode of Visit: Video  Location of patient: -HOME-  Location of provider: +HOME+  You have chosen to receive care through a  telehealth visit.  The patient has signed the video visit consent form.  The visit included audio and video interaction. No technical issues occurred during this visit.      Note Disclaimer: At [facility], we believe that sharing information builds trust and better   relationships. You are receiving this note because you recently visited [facility]. It is possible you   will see health information before a provider has talked with you about it. This kind of information can   be easy to misunderstand. To help you fully understand what it means for your health, we urge you to   discuss this note with your provider.

## 2025-03-20 ENCOUNTER — OFFICE VISIT (OUTPATIENT)
Dept: FAMILY MEDICINE CLINIC | Facility: CLINIC | Age: 30
End: 2025-03-20
Payer: COMMERCIAL

## 2025-03-20 VITALS
SYSTOLIC BLOOD PRESSURE: 142 MMHG | TEMPERATURE: 98.4 F | OXYGEN SATURATION: 99 % | HEART RATE: 103 BPM | BODY MASS INDEX: 39.71 KG/M2 | HEIGHT: 68 IN | WEIGHT: 262 LBS | DIASTOLIC BLOOD PRESSURE: 84 MMHG

## 2025-03-20 DIAGNOSIS — Z88.9 MULTIPLE ALLERGIES: ICD-10-CM

## 2025-03-20 DIAGNOSIS — E03.9 HYPOTHYROIDISM, UNSPECIFIED TYPE: ICD-10-CM

## 2025-03-20 DIAGNOSIS — Z83.3 FAMILY HISTORY OF DIABETES MELLITUS: ICD-10-CM

## 2025-03-20 DIAGNOSIS — Z00.00 PREVENTATIVE HEALTH CARE: ICD-10-CM

## 2025-03-20 DIAGNOSIS — T78.40XA ALLERGY, INITIAL ENCOUNTER: ICD-10-CM

## 2025-03-20 DIAGNOSIS — E66.01 CLASS 2 SEVERE OBESITY DUE TO EXCESS CALORIES WITH SERIOUS COMORBIDITY AND BODY MASS INDEX (BMI) OF 39.0 TO 39.9 IN ADULT: ICD-10-CM

## 2025-03-20 DIAGNOSIS — E78.1 HYPERTRIGLYCERIDEMIA: Primary | ICD-10-CM

## 2025-03-20 DIAGNOSIS — E66.812 CLASS 2 SEVERE OBESITY DUE TO EXCESS CALORIES WITH SERIOUS COMORBIDITY AND BODY MASS INDEX (BMI) OF 39.0 TO 39.9 IN ADULT: ICD-10-CM

## 2025-03-20 RX ORDER — AMLODIPINE BESYLATE 10 MG/1
10 TABLET ORAL DAILY
Qty: 90 TABLET | Refills: 1 | Status: SHIPPED | OUTPATIENT
Start: 2025-03-20

## 2025-03-20 RX ORDER — LISINOPRIL AND HYDROCHLOROTHIAZIDE 12.5; 2 MG/1; MG/1
1 TABLET ORAL DAILY
Qty: 90 TABLET | Refills: 1 | Status: SHIPPED | OUTPATIENT
Start: 2025-03-20

## 2025-03-20 NOTE — PROGRESS NOTES
"    Meeta Figueredo is a 29 y.o. female.     History of Present Illness  29-year-old white female with history of hypertension, GERD, PCOS and headaches who comes in today for 6-month follow-up visit fasting blood work    Blood pressure 142/82 heart rate 102 she denies any chest pain, dyspnea, tachycardia or dizziness    Patient having a lot of allergy symptoms and rashes.  She is wanting get allergy tested I will refer her to family allergy for testing    Weight is up 16 pounds with a BMI to 62 and a BMI of 39.8.  She has had 2 COVID vaccines need to schedule an eye exam is up-to-date on Pap smears          Fasting blood work  Allergy referral  Diet and exercise  Schedule eye exam  Follow-up 6 months       The following portions of the patient's history were reviewed and updated as appropriate: allergies, current medications, past family history, past medical history, past social history, past surgical history, and problem list.    Vitals:    03/20/25 0858   BP: 142/84   BP Location: Right arm   Patient Position: Sitting   Cuff Size: Adult   Pulse: 103   Temp: 98.4 °F (36.9 °C)   TempSrc: Skin   SpO2: 99%   Weight: 119 kg (262 lb)   Height: 172.7 cm (68\")       Past Medical History:   Diagnosis Date    Class 2 obesity due to excess calories without serious comorbidity with body mass index (BMI) of 36.0 to 36.9 in adult 7/6/2021    Hypertension     PCOS (polycystic ovarian syndrome)      Past Surgical History:   Procedure Laterality Date    CHOLECYSTECTOMY N/A 9/13/2021    Procedure: CHOLECYSTECTOMY LAPAROSCOPIC;  Surgeon: Clayton Gibson MD;  Location: [facility];  Service: General;  Laterality: N/A;    WISDOM TOOTH EXTRACTION  2011     Family History   Problem Relation Age of Onset    Hypertension Mother     Diabetes Father      Immunization History   Administered Date(s) Administered    DTP / HiB 02/13/1996, 04/16/1996    DTaP, Unspecified 09/09/1999    FluMist 2-49yrs 08/27/2013    FluMist 2-49yrs " (Nasal) 01/24/2013    Fluzone (or Fluarix & Flulaval for VFC) >6mos 09/01/2020    HPV Quadrivalent 09/08/2010, 03/10/2011    Hepatitis A 10/29/2018    Influenza, Unspecified 10/29/2018    MMR 09/09/1999    OPV 02/13/1996, 04/16/1996, 09/09/1999    Tdap 04/16/2018       Admission on 02/25/2025, Discharged on 02/25/2025   Component Date Value Ref Range Status    STREP A PCR 02/25/2025 Not Detected  Not Detected Final    COVID19 02/25/2025 Not Detected  Not Detected - Ref. Range Final    Influenza A PCR 02/25/2025 Not Detected  Not Detected Final    Influenza B PCR 02/25/2025 Not Detected  Not Detected Final         Review of Systems   Constitutional: Negative.    HENT:  Positive for congestion.    Respiratory: Negative.     Cardiovascular: Negative.    Gastrointestinal: Negative.    Genitourinary: Negative.    Musculoskeletal: Negative.    Skin:  Positive for rash.   Neurological: Negative.    Psychiatric/Behavioral: Negative.         Objective   Physical Exam  Constitutional:       Appearance: Normal appearance.   HENT:      Head: Normocephalic.   Cardiovascular:      Rate and Rhythm: Normal rate and regular rhythm.      Pulses: Normal pulses.      Heart sounds: Normal heart sounds.   Pulmonary:      Effort: Pulmonary effort is normal.      Breath sounds: Normal breath sounds.   Abdominal:      General: Bowel sounds are normal.   Musculoskeletal:         General: Normal range of motion.   Skin:     Comments: With itchy rashes off and on on all areas of body along with nasal congestion and other allergy symptoms   Neurological:      General: No focal deficit present.      Mental Status: She is alert and oriented to person, place, and time.   Psychiatric:         Mood and Affect: Mood normal.         Behavior: Behavior normal.         Procedures    Assessment & Plan   Diagnoses and all orders for this visit:    1. Hypertriglyceridemia (Primary)  -     Lipid Panel With LDL / HDL Ratio    2. Hypothyroidism, unspecified  type  -     TSH+Free T4  -     T3    3. Preventative health care  -     CBC & Differential    4. Family history of diabetes mellitus  -     Comprehensive Metabolic Panel  -     Hemoglobin A1c    5. Allergy, initial encounter  -     Ambulatory Referral to Allergy    Other orders  -     amLODIPine (NORVASC) 10 MG tablet; Take 1 tablet by mouth Daily.  Dispense: 90 tablet; Refill: 1  -     lisinopril-hydrochlorothiazide (PRINZIDE,ZESTORETIC) 20-12.5 MG per tablet; Take 1 tablet by mouth Daily.  Dispense: 90 tablet; Refill: 1           Current Outpatient Medications:     amLODIPine (NORVASC) 10 MG tablet, Take 1 tablet by mouth Daily., Disp: 90 tablet, Rfl: 1    drospirenone-ethinyl estradiol (AC,OCELLA) 3-0.03 MG per tablet, Take 1 tablet by mouth Daily., Disp: , Rfl:     lisinopril-hydrochlorothiazide (PRINZIDE,ZESTORETIC) 20-12.5 MG per tablet, Take 1 tablet by mouth Daily., Disp: 90 tablet, Rfl: 1    melatonin 5 MG tablet tablet, Take 2 tablets by mouth. 2qhs (Patient not taking: Reported on 3/20/2025), Disp: , Rfl:            Colleen Mcelroy, TADEO 3/20/2025 09:17 EDT  This note has been electronically signed

## 2025-03-21 LAB
ALBUMIN SERPL-MCNC: 4 G/DL (ref 4–5)
ALP SERPL-CCNC: 58 IU/L (ref 44–121)
ALT SERPL-CCNC: 13 IU/L (ref 0–32)
AST SERPL-CCNC: 12 IU/L (ref 0–40)
BASOPHILS # BLD AUTO: 0.1 X10E3/UL (ref 0–0.2)
BASOPHILS NFR BLD AUTO: 1 %
BILIRUB SERPL-MCNC: 0.2 MG/DL (ref 0–1.2)
BUN SERPL-MCNC: 10 MG/DL (ref 6–20)
BUN/CREAT SERPL: 17 (ref 9–23)
CALCIUM SERPL-MCNC: 9.6 MG/DL (ref 8.7–10.2)
CHLORIDE SERPL-SCNC: 103 MMOL/L (ref 96–106)
CHOLEST SERPL-MCNC: 145 MG/DL (ref 100–199)
CO2 SERPL-SCNC: 19 MMOL/L (ref 20–29)
CREAT SERPL-MCNC: 0.58 MG/DL (ref 0.57–1)
EGFRCR SERPLBLD CKD-EPI 2021: 126 ML/MIN/1.73
EOSINOPHIL # BLD AUTO: 0.2 X10E3/UL (ref 0–0.4)
EOSINOPHIL NFR BLD AUTO: 2 %
ERYTHROCYTE [DISTWIDTH] IN BLOOD BY AUTOMATED COUNT: 13.1 % (ref 11.7–15.4)
GLOBULIN SER CALC-MCNC: 3 G/DL (ref 1.5–4.5)
GLUCOSE SERPL-MCNC: 81 MG/DL (ref 70–99)
HBA1C MFR BLD: 5.1 % (ref 4.8–5.6)
HCT VFR BLD AUTO: 41 % (ref 34–46.6)
HDLC SERPL-MCNC: 47 MG/DL
HGB BLD-MCNC: 13.2 G/DL (ref 11.1–15.9)
IMM GRANULOCYTES # BLD AUTO: 0.1 X10E3/UL (ref 0–0.1)
IMM GRANULOCYTES NFR BLD AUTO: 1 %
LDLC SERPL CALC-MCNC: 63 MG/DL (ref 0–99)
LDLC/HDLC SERPL: 1.3 RATIO (ref 0–3.2)
LYMPHOCYTES # BLD AUTO: 2.8 X10E3/UL (ref 0.7–3.1)
LYMPHOCYTES NFR BLD AUTO: 28 %
MCH RBC QN AUTO: 27.8 PG (ref 26.6–33)
MCHC RBC AUTO-ENTMCNC: 32.2 G/DL (ref 31.5–35.7)
MCV RBC AUTO: 86 FL (ref 79–97)
MONOCYTES # BLD AUTO: 0.5 X10E3/UL (ref 0.1–0.9)
MONOCYTES NFR BLD AUTO: 5 %
NEUTROPHILS # BLD AUTO: 6.2 X10E3/UL (ref 1.4–7)
NEUTROPHILS NFR BLD AUTO: 63 %
PLATELET # BLD AUTO: 428 X10E3/UL (ref 150–450)
POTASSIUM SERPL-SCNC: 4.3 MMOL/L (ref 3.5–5.2)
PROT SERPL-MCNC: 7 G/DL (ref 6–8.5)
RBC # BLD AUTO: 4.75 X10E6/UL (ref 3.77–5.28)
SODIUM SERPL-SCNC: 139 MMOL/L (ref 134–144)
T3 SERPL-MCNC: 252 NG/DL (ref 71–180)
T4 FREE SERPL-MCNC: 1.13 NG/DL (ref 0.82–1.77)
TRIGL SERPL-MCNC: 218 MG/DL (ref 0–149)
TSH SERPL DL<=0.005 MIU/L-ACNC: 1.71 UIU/ML (ref 0.45–4.5)
VLDLC SERPL CALC-MCNC: 35 MG/DL (ref 5–40)
WBC # BLD AUTO: 9.8 X10E3/UL (ref 3.4–10.8)

## 2025-04-24 ENCOUNTER — LAB (OUTPATIENT)
Dept: LAB | Facility: HOSPITAL | Age: 30
End: 2025-04-24
Payer: COMMERCIAL

## 2025-04-24 ENCOUNTER — TRANSCRIBE ORDERS (OUTPATIENT)
Dept: LAB | Facility: HOSPITAL | Age: 30
End: 2025-04-24
Payer: COMMERCIAL

## 2025-04-24 DIAGNOSIS — L50.8 AQUAGENIC URTICARIA: ICD-10-CM

## 2025-04-24 DIAGNOSIS — L50.1 IDIOPATHIC URTICARIA: Primary | ICD-10-CM

## 2025-04-24 DIAGNOSIS — L50.1 IDIOPATHIC URTICARIA: ICD-10-CM

## 2025-04-24 LAB
ALBUMIN SERPL-MCNC: 4 G/DL (ref 3.5–5.2)
ALBUMIN/GLOB SERPL: 1.3 G/DL
ALP SERPL-CCNC: 92 U/L (ref 39–117)
ALT SERPL W P-5'-P-CCNC: 38 U/L (ref 1–33)
ANION GAP SERPL CALCULATED.3IONS-SCNC: 12.8 MMOL/L (ref 5–15)
AST SERPL-CCNC: 36 U/L (ref 1–32)
BASOPHILS # BLD AUTO: 0.12 10*3/MM3 (ref 0–0.2)
BASOPHILS NFR BLD AUTO: 1.2 % (ref 0–1.5)
BILIRUB SERPL-MCNC: 0.3 MG/DL (ref 0–1.2)
BUN SERPL-MCNC: 8 MG/DL (ref 6–20)
BUN/CREAT SERPL: 13.1 (ref 7–25)
CALCIUM SPEC-SCNC: 9.3 MG/DL (ref 8.6–10.5)
CHLORIDE SERPL-SCNC: 103 MMOL/L (ref 98–107)
CO2 SERPL-SCNC: 22.2 MMOL/L (ref 22–29)
CREAT SERPL-MCNC: 0.61 MG/DL (ref 0.57–1)
DEPRECATED RDW RBC AUTO: 43 FL (ref 37–54)
EGFRCR SERPLBLD CKD-EPI 2021: 124.3 ML/MIN/1.73
EOSINOPHIL # BLD AUTO: 0.21 10*3/MM3 (ref 0–0.4)
EOSINOPHIL NFR BLD AUTO: 2.1 % (ref 0.3–6.2)
ERYTHROCYTE [DISTWIDTH] IN BLOOD BY AUTOMATED COUNT: 13.2 % (ref 12.3–15.4)
ERYTHROCYTE [SEDIMENTATION RATE] IN BLOOD: 5 MM/HR (ref 0–20)
GLOBULIN UR ELPH-MCNC: 3.2 GM/DL
GLUCOSE SERPL-MCNC: 76 MG/DL (ref 65–99)
HCT VFR BLD AUTO: 39.5 % (ref 34–46.6)
HGB BLD-MCNC: 12.7 G/DL (ref 12–15.9)
IMM GRANULOCYTES # BLD AUTO: 0.07 10*3/MM3 (ref 0–0.05)
IMM GRANULOCYTES NFR BLD AUTO: 0.7 % (ref 0–0.5)
LYMPHOCYTES # BLD AUTO: 3.68 10*3/MM3 (ref 0.7–3.1)
LYMPHOCYTES NFR BLD AUTO: 36.8 % (ref 19.6–45.3)
MCH RBC QN AUTO: 28.4 PG (ref 26.6–33)
MCHC RBC AUTO-ENTMCNC: 32.2 G/DL (ref 31.5–35.7)
MCV RBC AUTO: 88.4 FL (ref 79–97)
MONOCYTES # BLD AUTO: 0.53 10*3/MM3 (ref 0.1–0.9)
MONOCYTES NFR BLD AUTO: 5.3 % (ref 5–12)
NEUTROPHILS NFR BLD AUTO: 5.39 10*3/MM3 (ref 1.7–7)
NEUTROPHILS NFR BLD AUTO: 53.9 % (ref 42.7–76)
NRBC BLD AUTO-RTO: 0 /100 WBC (ref 0–0.2)
PLATELET # BLD AUTO: 407 10*3/MM3 (ref 140–450)
PMV BLD AUTO: 11.6 FL (ref 6–12)
POTASSIUM SERPL-SCNC: 3.7 MMOL/L (ref 3.5–5.2)
PROT SERPL-MCNC: 7.2 G/DL (ref 6–8.5)
RBC # BLD AUTO: 4.47 10*6/MM3 (ref 3.77–5.28)
SODIUM SERPL-SCNC: 138 MMOL/L (ref 136–145)
T3FREE SERPL-MCNC: 3.91 PG/ML (ref 2–4.4)
T4 FREE SERPL-MCNC: 1.17 NG/DL (ref 0.92–1.68)
TSH SERPL DL<=0.05 MIU/L-ACNC: 1.55 UIU/ML (ref 0.27–4.2)
WBC NRBC COR # BLD AUTO: 10 10*3/MM3 (ref 3.4–10.8)

## 2025-04-24 PROCEDURE — 83520 IMMUNOASSAY QUANT NOS NONAB: CPT

## 2025-04-24 PROCEDURE — 86376 MICROSOMAL ANTIBODY EACH: CPT

## 2025-04-24 PROCEDURE — 86800 THYROGLOBULIN ANTIBODY: CPT

## 2025-04-24 PROCEDURE — 36415 COLL VENOUS BLD VENIPUNCTURE: CPT

## 2025-04-24 PROCEDURE — 84439 ASSAY OF FREE THYROXINE: CPT

## 2025-04-24 PROCEDURE — 84481 FREE ASSAY (FT-3): CPT

## 2025-04-24 PROCEDURE — 80050 GENERAL HEALTH PANEL: CPT

## 2025-04-24 PROCEDURE — 85652 RBC SED RATE AUTOMATED: CPT

## 2025-04-24 PROCEDURE — 86008 ALLG SPEC IGE RECOMB EA: CPT

## 2025-04-24 PROCEDURE — 82785 ASSAY OF IGE: CPT

## 2025-04-25 LAB
THYROGLOB AB SERPL-ACNC: 109.8 IU/ML (ref 0–0.9)
THYROPEROXIDASE AB SERPL-ACNC: 12 IU/ML (ref 0–34)

## 2025-04-30 LAB
ALPHA-GAL IGE QN: <0.1 KU/L
TRYPTASE SERPL-MCNC: 2 UG/L (ref 2.2–13.2)

## 2025-05-02 LAB — IGE SERPL-ACNC: 198 IU/ML (ref 6–495)

## 2025-05-05 DIAGNOSIS — M19.90 ARTHRITIS PAIN: Primary | ICD-10-CM

## 2025-05-11 ENCOUNTER — HOSPITAL ENCOUNTER (EMERGENCY)
Facility: HOSPITAL | Age: 30
Discharge: HOME | End: 2025-05-12
Attending: EMERGENCY MEDICINE
Payer: COMMERCIAL

## 2025-05-11 DIAGNOSIS — K52.9 ENTERITIS: ICD-10-CM

## 2025-05-11 DIAGNOSIS — R10.9 ACUTE ABDOMINAL PAIN: Primary | ICD-10-CM

## 2025-05-11 LAB
BACTERIA UR QL AUTO: ABNORMAL /HPF
BASOPHILS # BLD AUTO: 0.11 10*3/MM3 (ref 0–0.2)
BASOPHILS NFR BLD AUTO: 0.6 % (ref 0–1.5)
BILIRUB UR QL STRIP: ABNORMAL
CLARITY UR: ABNORMAL
COLOR UR: ABNORMAL
DEPRECATED RDW RBC AUTO: 42.4 FL (ref 37–54)
EOSINOPHIL # BLD AUTO: 0.1 10*3/MM3 (ref 0–0.4)
EOSINOPHIL NFR BLD AUTO: 0.5 % (ref 0.3–6.2)
ERYTHROCYTE [DISTWIDTH] IN BLOOD BY AUTOMATED COUNT: 13.3 % (ref 12.3–15.4)
GLUCOSE UR STRIP-MCNC: NEGATIVE MG/DL
HCG SERPL QL: NEGATIVE
HCT VFR BLD AUTO: 44.7 % (ref 34–46.6)
HGB BLD-MCNC: 14.6 G/DL (ref 12–15.9)
HGB UR QL STRIP.AUTO: NEGATIVE
HYALINE CASTS UR QL AUTO: ABNORMAL /LPF
IMM GRANULOCYTES # BLD AUTO: 0.15 10*3/MM3 (ref 0–0.05)
IMM GRANULOCYTES NFR BLD AUTO: 0.8 % (ref 0–0.5)
KETONES UR QL STRIP: ABNORMAL
LEUKOCYTE ESTERASE UR QL STRIP.AUTO: ABNORMAL
LYMPHOCYTES # BLD AUTO: 2.91 10*3/MM3 (ref 0.7–3.1)
LYMPHOCYTES NFR BLD AUTO: 15.8 % (ref 19.6–45.3)
MCH RBC QN AUTO: 28.5 PG (ref 26.6–33)
MCHC RBC AUTO-ENTMCNC: 32.7 G/DL (ref 31.5–35.7)
MCV RBC AUTO: 87.1 FL (ref 79–97)
MONOCYTES # BLD AUTO: 0.77 10*3/MM3 (ref 0.1–0.9)
MONOCYTES NFR BLD AUTO: 4.2 % (ref 5–12)
NEUTROPHILS NFR BLD AUTO: 14.42 10*3/MM3 (ref 1.7–7)
NEUTROPHILS NFR BLD AUTO: 78.1 % (ref 42.7–76)
NITRITE UR QL STRIP: NEGATIVE
NRBC BLD AUTO-RTO: 0 /100 WBC (ref 0–0.2)
PH UR STRIP.AUTO: <=5 [PH] (ref 5–8)
PLATELET # BLD AUTO: 413 10*3/MM3 (ref 140–450)
PMV BLD AUTO: 10.6 FL (ref 6–12)
PROT UR QL STRIP: ABNORMAL
RBC # BLD AUTO: 5.13 10*6/MM3 (ref 3.77–5.28)
RBC # UR STRIP: ABNORMAL /HPF
REF LAB TEST METHOD: ABNORMAL
SP GR UR STRIP: 1.03 (ref 1–1.03)
SQUAMOUS #/AREA URNS HPF: ABNORMAL /HPF
UROBILINOGEN UR QL STRIP: ABNORMAL
WBC # UR STRIP: ABNORMAL /HPF
WBC NRBC COR # BLD AUTO: 18.46 10*3/MM3 (ref 3.4–10.8)
WHOLE BLOOD HOLD COAG: NORMAL

## 2025-05-11 PROCEDURE — 85025 COMPLETE CBC W/AUTO DIFF WBC: CPT | Performed by: EMERGENCY MEDICINE

## 2025-05-11 PROCEDURE — 81001 URINALYSIS AUTO W/SCOPE: CPT | Performed by: EMERGENCY MEDICINE

## 2025-05-11 PROCEDURE — 25810000003 SODIUM CHLORIDE 0.9 % SOLUTION: Performed by: EMERGENCY MEDICINE

## 2025-05-11 PROCEDURE — 80053 COMPREHEN METABOLIC PANEL: CPT | Performed by: EMERGENCY MEDICINE

## 2025-05-11 PROCEDURE — 96374 THER/PROPH/DIAG INJ IV PUSH: CPT

## 2025-05-11 PROCEDURE — 96361 HYDRATE IV INFUSION ADD-ON: CPT

## 2025-05-11 PROCEDURE — 99285 EMERGENCY DEPT VISIT HI MDM: CPT

## 2025-05-11 PROCEDURE — 87086 URINE CULTURE/COLONY COUNT: CPT | Performed by: EMERGENCY MEDICINE

## 2025-05-11 PROCEDURE — 83690 ASSAY OF LIPASE: CPT | Performed by: EMERGENCY MEDICINE

## 2025-05-11 PROCEDURE — 84703 CHORIONIC GONADOTROPIN ASSAY: CPT | Performed by: EMERGENCY MEDICINE

## 2025-05-11 PROCEDURE — 25010000002 ONDANSETRON PER 1 MG: Performed by: EMERGENCY MEDICINE

## 2025-05-11 RX ORDER — ONDANSETRON 2 MG/ML
4 INJECTION INTRAMUSCULAR; INTRAVENOUS ONCE
Status: COMPLETED | OUTPATIENT
Start: 2025-05-11 | End: 2025-05-11

## 2025-05-11 RX ADMIN — ONDANSETRON 4 MG: 2 INJECTION, SOLUTION INTRAMUSCULAR; INTRAVENOUS at 23:41

## 2025-05-11 RX ADMIN — SODIUM CHLORIDE 1000 ML: 9 INJECTION, SOLUTION INTRAVENOUS at 23:41

## 2025-05-11 NOTE — Clinical Note
[facility] EMERGENCY DEPARTMENT  [address] [place] [address]  Phone: [phone]    Meeta Figueredo was seen and treated in our emergency department on 5/11/2025.  She may return to work on 05/14/2025.         Thank you for choosing [facility].    Ac Juarez MD

## 2025-05-12 ENCOUNTER — APPOINTMENT (OUTPATIENT)
Dept: CT IMAGING | Facility: HOSPITAL | Age: 30
End: 2025-05-12
Payer: COMMERCIAL

## 2025-05-12 VITALS
DIASTOLIC BLOOD PRESSURE: 89 MMHG | OXYGEN SATURATION: 100 % | RESPIRATION RATE: 18 BRPM | SYSTOLIC BLOOD PRESSURE: 131 MMHG | BODY MASS INDEX: 39.29 KG/M2 | HEIGHT: 68 IN | WEIGHT: 259.26 LBS | TEMPERATURE: 98.2 F | HEART RATE: 96 BPM

## 2025-05-12 LAB
ALBUMIN SERPL-MCNC: 4 G/DL (ref 3.5–5.2)
ALBUMIN/GLOB SERPL: 1.1 G/DL
ALP SERPL-CCNC: 74 U/L (ref 39–117)
ALT SERPL W P-5'-P-CCNC: 13 U/L (ref 1–33)
ANION GAP SERPL CALCULATED.3IONS-SCNC: 13.3 MMOL/L (ref 5–15)
AST SERPL-CCNC: 14 U/L (ref 1–32)
BILIRUB SERPL-MCNC: 0.6 MG/DL (ref 0–1.2)
BILIRUB UR QL STRIP: NEGATIVE
BUN SERPL-MCNC: 10 MG/DL (ref 6–20)
BUN/CREAT SERPL: 13.7 (ref 7–25)
CALCIUM SPEC-SCNC: 9.1 MG/DL (ref 8.6–10.5)
CHLORIDE SERPL-SCNC: 102 MMOL/L (ref 98–107)
CLARITY UR: CLEAR
CO2 SERPL-SCNC: 20.7 MMOL/L (ref 22–29)
COLOR UR: ABNORMAL
CREAT SERPL-MCNC: 0.73 MG/DL (ref 0.57–1)
EGFRCR SERPLBLD CKD-EPI 2021: 114.3 ML/MIN/1.73
GLOBULIN UR ELPH-MCNC: 3.5 GM/DL
GLUCOSE SERPL-MCNC: 119 MG/DL (ref 65–99)
GLUCOSE UR STRIP-MCNC: NEGATIVE MG/DL
HGB UR QL STRIP.AUTO: NEGATIVE
KETONES UR QL STRIP: ABNORMAL
LEUKOCYTE ESTERASE UR QL STRIP.AUTO: NEGATIVE
LIPASE SERPL-CCNC: 10 U/L (ref 13–60)
NITRITE UR QL STRIP: NEGATIVE
PH UR STRIP.AUTO: 5.5 [PH] (ref 5–8)
POTASSIUM SERPL-SCNC: 4.1 MMOL/L (ref 3.5–5.2)
PROT SERPL-MCNC: 7.5 G/DL (ref 6–8.5)
PROT UR QL STRIP: NEGATIVE
SODIUM SERPL-SCNC: 136 MMOL/L (ref 136–145)
SP GR UR STRIP: 1.06 (ref 1–1.03)
UROBILINOGEN UR QL STRIP: ABNORMAL

## 2025-05-12 PROCEDURE — 25510000001 IOPAMIDOL PER 1 ML: Performed by: EMERGENCY MEDICINE

## 2025-05-12 PROCEDURE — 81003 URINALYSIS AUTO W/O SCOPE: CPT | Performed by: EMERGENCY MEDICINE

## 2025-05-12 PROCEDURE — 74177 CT ABD & PELVIS W/CONTRAST: CPT

## 2025-05-12 PROCEDURE — P9612 CATHETERIZE FOR URINE SPEC: HCPCS

## 2025-05-12 RX ORDER — IOPAMIDOL 755 MG/ML
100 INJECTION, SOLUTION INTRAVASCULAR
Status: COMPLETED | OUTPATIENT
Start: 2025-05-12 | End: 2025-05-12

## 2025-05-12 RX ORDER — ONDANSETRON 4 MG/1
4 TABLET, ORALLY DISINTEGRATING ORAL EVERY 8 HOURS PRN
Qty: 20 TABLET | Refills: 0 | Status: SHIPPED | OUTPATIENT
Start: 2025-05-12

## 2025-05-12 RX ADMIN — IOPAMIDOL 100 ML: 755 INJECTION, SOLUTION INTRAVENOUS at 00:19

## 2025-05-12 NOTE — ED PROVIDER NOTES
Subjective   History of Present Illness  29-year-old female with vomiting and diarrhea with diffuse abdominal pain since Saturday presents for evaluation, no fever, no sick contacts.      Review of Systems   Gastrointestinal:  Positive for abdominal pain, diarrhea, nausea and vomiting.       Past Medical History:   Diagnosis Date    Class 2 obesity due to excess calories without serious comorbidity with body mass index (BMI) of 36.0 to 36.9 in adult 7/6/2021    Hypertension     PCOS (polycystic ovarian syndrome)        No Known Allergies    Past Surgical History:   Procedure Laterality Date    CHOLECYSTECTOMY N/A 9/13/2021    Procedure: CHOLECYSTECTOMY LAPAROSCOPIC;  Surgeon: Clayton Gibson MD;  Location: [facility] MAIN OR;  Service: General;  Laterality: N/A;    WISDOM TOOTH EXTRACTION  2011       Family History   Problem Relation Age of Onset    Hypertension Mother     Diabetes Father        Social History     Socioeconomic History    Marital status:    Tobacco Use    Smoking status: Never     Passive exposure: Never    Smokeless tobacco: Never   Vaping Use    Vaping status: Never Used   Substance and Sexual Activity    Alcohol use: Never    Drug use: Never    Sexual activity: Yes     Partners: Female     Birth control/protection: Condom, Birth control pill           Objective   Physical Exam  Constitutional:       General: She is not in acute distress.  HENT:      Head: Normocephalic and atraumatic.   Cardiovascular:      Rate and Rhythm: Tachycardia present.      Heart sounds: Normal heart sounds.   Pulmonary:      Effort: Pulmonary effort is normal.      Breath sounds: Normal breath sounds.   Abdominal:      General: Bowel sounds are normal.      Palpations: Abdomen is soft.      Comments: Diffuse abdominal tenderness, no rebound or guarding   Skin:     General: Skin is warm and dry.      Capillary Refill: Capillary refill takes less than 2 seconds.   Neurological:      Mental Status: She is alert.          Procedures           ED Course                                                       Medical Decision Making  On reevaluation, patient appears well, symptoms mild, found to have terminal ileitis on CT favored to be infectious in this scenario.  No family history of inflammatory bowel disorders, differential discussed, return precautions reviewed in detail.    Problems Addressed:  Acute abdominal pain: complicated acute illness or injury  Enteritis: complicated acute illness or injury    Amount and/or Complexity of Data Reviewed  External Data Reviewed: notes.     Details: Clinic note reviewed from 3/20/2025  Labs: ordered.     Details: White blood cell count 18  Radiology: ordered and independent interpretation performed.     Details: CT reviewed    Risk  Prescription drug management.        Final diagnoses:   Acute abdominal pain   Enteritis       ED Disposition  ED Disposition       ED Disposition   Discharge    Condition   Stable    Comment   --               Colleen Mcelroy, APRN  [address]  [address]  [place] [address] [phone]               Medication List        New Prescriptions      ondansetron ODT 4 MG disintegrating tablet  Commonly known as: ZOFRAN-ODT  Place 1 tablet on the tongue Every 8 (Eight) Hours As Needed for Nausea.               Where to Get Your Medications        [facility] [address] [place] [address] - [address] [place] [address] - [phone]  - [phone]   [address] [place] [address]      Phone: [phone]   ondansetron ODT 4 MG disintegrating tablet            Ac Juarez MD  05/12/25 015

## 2025-05-13 LAB — BACTERIA SPEC AEROBE CULT: NORMAL

## (undated) DEVICE — 2, DISPOSABLE SUCTION/IRRIGATOR WITH DISPOSABLE TIP: Brand: STRYKEFLOW

## (undated) DEVICE — ENDOPATH 5MM CURVED SCISSORS WITH MONOPOLAR CAUTERY: Brand: ENDOPATH

## (undated) DEVICE — PASS SUT PRO BARIATRIC XL W/TROC SWABS

## (undated) DEVICE — SUT VIC 0/0 UR6 27IN DYED J603H

## (undated) DEVICE — ENDOPATH XCEL WITH OPTIVIEW TECHNOLOGY BLADELESS TROCARS WITH STABILITY SLEEVES: Brand: ENDOPATH XCEL OPTIVIEW

## (undated) DEVICE — SOL IRRIG NACL 1000ML

## (undated) DEVICE — UNDERGLV SURG BIOGEL INDICATOR LTX PF 7

## (undated) DEVICE — BLANKT WARM UPPR/BDY ARM/OUT 57X196CM

## (undated) DEVICE — GENERAL LAPAROSCOPY CDS: Brand: MEDLINE INDUSTRIES, INC.

## (undated) DEVICE — APPL HEMO SURG ARISTA/AH/FLEXITIP XL 38CM

## (undated) DEVICE — UNDYED BRAIDED (POLYGLACTIN 910), SYNTHETIC ABSORBABLE SUTURE: Brand: COATED VICRYL

## (undated) DEVICE — SLV SCD CALF HEMOFORCE DVT THERP REPROC MD

## (undated) DEVICE — LAPAROSCOPIC GAS CONDITIONING DEVICE.: Brand: INSUFLOW

## (undated) DEVICE — GLV SURG BIOGEL LTX PF 7

## (undated) DEVICE — SOL IRR H2O BTL 1000ML STRL

## (undated) DEVICE — KT SURG TURNOVER 050

## (undated) DEVICE — ADHS SKIN PREMIERPRO EXOFIN TOPICAL HI/VISC .5ML

## (undated) DEVICE — ENDOPATH XCEL BLUNT TIP TROCARS WITH SMOOTH SLEEVES: Brand: ENDOPATH XCEL